# Patient Record
Sex: FEMALE
[De-identification: names, ages, dates, MRNs, and addresses within clinical notes are randomized per-mention and may not be internally consistent; named-entity substitution may affect disease eponyms.]

---

## 2017-10-19 PROBLEM — Z00.00 ENCOUNTER FOR PREVENTIVE HEALTH EXAMINATION: Status: ACTIVE | Noted: 2017-10-19

## 2017-11-09 ENCOUNTER — MEDICATION RENEWAL (OUTPATIENT)
Age: 82
End: 2017-11-09

## 2017-11-10 ENCOUNTER — APPOINTMENT (OUTPATIENT)
Dept: ENDOCRINOLOGY | Facility: CLINIC | Age: 82
End: 2017-11-10
Payer: MEDICARE

## 2017-11-10 VITALS
OXYGEN SATURATION: 98 % | WEIGHT: 177 LBS | HEART RATE: 84 BPM | SYSTOLIC BLOOD PRESSURE: 150 MMHG | HEIGHT: 60 IN | TEMPERATURE: 99.1 F | DIASTOLIC BLOOD PRESSURE: 82 MMHG | BODY MASS INDEX: 34.75 KG/M2

## 2017-11-10 DIAGNOSIS — M54.16 SPINAL STENOSIS, LUMBAR REGION WITHOUT NEUROGENIC CLAUDICATION: ICD-10-CM

## 2017-11-10 DIAGNOSIS — Z83.6 FAMILY HISTORY OF OTHER DISEASES OF THE RESPIRATORY SYSTEM: ICD-10-CM

## 2017-11-10 DIAGNOSIS — Z82.49 FAMILY HISTORY OF ISCHEMIC HEART DISEASE AND OTHER DISEASES OF THE CIRCULATORY SYSTEM: ICD-10-CM

## 2017-11-10 DIAGNOSIS — N13.5 CROSSING VESSEL AND STRICTURE OF URETER W/OUT HYDRONEPHROSIS: ICD-10-CM

## 2017-11-10 DIAGNOSIS — N32.89 OTHER SPECIFIED DISORDERS OF BLADDER: ICD-10-CM

## 2017-11-10 DIAGNOSIS — M48.061 SPINAL STENOSIS, LUMBAR REGION WITHOUT NEUROGENIC CLAUDICATION: ICD-10-CM

## 2017-11-10 DIAGNOSIS — N28.1 CYST OF KIDNEY, ACQUIRED: ICD-10-CM

## 2017-11-10 DIAGNOSIS — I38 ENDOCARDITIS, VALVE UNSPECIFIED: ICD-10-CM

## 2017-11-10 DIAGNOSIS — K21.9 GASTRO-ESOPHAGEAL REFLUX DISEASE W/OUT ESOPHAGITIS: ICD-10-CM

## 2017-11-10 DIAGNOSIS — M19.90 UNSPECIFIED OSTEOARTHRITIS, UNSPECIFIED SITE: ICD-10-CM

## 2017-11-10 DIAGNOSIS — Z82.3 FAMILY HISTORY OF STROKE: ICD-10-CM

## 2017-11-10 PROCEDURE — 99214 OFFICE O/P EST MOD 30 MIN: CPT

## 2017-11-11 PROBLEM — Z83.6 FAMILY HISTORY OF PULMONARY FIBROSIS: Status: ACTIVE | Noted: 2017-11-11

## 2017-11-11 PROBLEM — Z82.49 FAMILY HISTORY OF CORONARY ARTERY DISEASE: Status: ACTIVE | Noted: 2017-11-11

## 2017-11-11 PROBLEM — N32.89 IRRITABLE BLADDER: Status: ACTIVE | Noted: 2017-11-11

## 2017-11-11 PROBLEM — Z82.3 FAMILY HISTORY OF CEREBROVASCULAR ACCIDENT (CVA): Status: ACTIVE | Noted: 2017-11-11

## 2017-11-11 RX ORDER — TRAMADOL HYDROCHLORIDE 50 MG/1
50 TABLET, COATED ORAL
Refills: 0 | Status: ACTIVE | COMMUNITY
Start: 2017-11-11

## 2017-11-11 RX ORDER — GLUCOSAMINE/CHONDR SU A SOD 750-600 MG
600-200 TABLET ORAL DAILY
Refills: 0 | Status: ACTIVE | COMMUNITY
Start: 2017-11-11

## 2017-11-11 RX ORDER — ASPIRIN 81 MG/1
81 TABLET ORAL
Refills: 0 | Status: ACTIVE | COMMUNITY
Start: 2017-11-11

## 2017-11-11 RX ORDER — OMEPRAZOLE 20 MG/1
20 CAPSULE, DELAYED RELEASE ORAL DAILY
Refills: 0 | Status: ACTIVE | COMMUNITY
Start: 2017-11-11

## 2017-11-22 ENCOUNTER — CLINICAL ADVICE (OUTPATIENT)
Age: 82
End: 2017-11-22

## 2017-11-22 LAB
25(OH)D3 SERPL-MCNC: 57 NG/ML
ALBUMIN SERPL ELPH-MCNC: 4.3 G/DL
ALP BLD-CCNC: 62 U/L
ALT SERPL-CCNC: 19 U/L
ANION GAP SERPL CALC-SCNC: 16 MMOL/L
AST SERPL-CCNC: 36 U/L
BILIRUB SERPL-MCNC: 0.4 MG/DL
BUN SERPL-MCNC: 16 MG/DL
CALCIUM SERPL-MCNC: 9.9 MG/DL
CHLORIDE SERPL-SCNC: 102 MMOL/L
CHOLEST SERPL-MCNC: 121 MG/DL
CHOLEST/HDLC SERPL: 3.3 RATIO
CO2 SERPL-SCNC: 26 MMOL/L
CREAT SERPL-MCNC: 1.02 MG/DL
CREAT SPEC-SCNC: 127 MG/DL
GLUCOSE SERPL-MCNC: 105 MG/DL
HBA1C MFR BLD HPLC: 6.4 %
HDLC SERPL-MCNC: 37 MG/DL
LDLC SERPL CALC-MCNC: 69 MG/DL
MICROALBUMIN 24H UR DL<=1MG/L-MCNC: 3.7 MG/DL
MICROALBUMIN/CREAT 24H UR-RTO: 29 MG/G
POTASSIUM SERPL-SCNC: 4.5 MMOL/L
PROT SERPL-MCNC: 7.6 G/DL
SODIUM SERPL-SCNC: 144 MMOL/L
T4 FREE SERPL-MCNC: 1.4 NG/DL
TRIGL SERPL-MCNC: 75 MG/DL
TSH SERPL-ACNC: 0.88 UIU/ML

## 2018-03-09 ENCOUNTER — APPOINTMENT (OUTPATIENT)
Dept: ENDOCRINOLOGY | Facility: CLINIC | Age: 83
End: 2018-03-09
Payer: MEDICARE

## 2018-03-09 VITALS
TEMPERATURE: 98 F | BODY MASS INDEX: 33.36 KG/M2 | DIASTOLIC BLOOD PRESSURE: 79 MMHG | SYSTOLIC BLOOD PRESSURE: 144 MMHG | HEIGHT: 61.5 IN | WEIGHT: 179 LBS | OXYGEN SATURATION: 97 % | HEART RATE: 73 BPM

## 2018-03-09 DIAGNOSIS — N39.0 URINARY TRACT INFECTION, SITE NOT SPECIFIED: ICD-10-CM

## 2018-03-09 PROCEDURE — 99214 OFFICE O/P EST MOD 30 MIN: CPT

## 2018-07-02 ENCOUNTER — APPOINTMENT (OUTPATIENT)
Dept: ENDOCRINOLOGY | Facility: CLINIC | Age: 83
End: 2018-07-02
Payer: MEDICARE

## 2018-07-02 VITALS
DIASTOLIC BLOOD PRESSURE: 76 MMHG | BODY MASS INDEX: 33.18 KG/M2 | WEIGHT: 178 LBS | OXYGEN SATURATION: 95 % | HEIGHT: 61.5 IN | TEMPERATURE: 98.4 F | SYSTOLIC BLOOD PRESSURE: 133 MMHG | HEART RATE: 79 BPM

## 2018-07-02 PROCEDURE — 99214 OFFICE O/P EST MOD 30 MIN: CPT

## 2018-07-26 ENCOUNTER — CLINICAL ADVICE (OUTPATIENT)
Age: 83
End: 2018-07-26

## 2018-07-31 ENCOUNTER — MEDICATION RENEWAL (OUTPATIENT)
Age: 83
End: 2018-07-31

## 2018-08-29 ENCOUNTER — CLINICAL ADVICE (OUTPATIENT)
Age: 83
End: 2018-08-29

## 2018-08-29 ENCOUNTER — MEDICATION RENEWAL (OUTPATIENT)
Age: 83
End: 2018-08-29

## 2018-10-12 ENCOUNTER — MEDICATION RENEWAL (OUTPATIENT)
Age: 83
End: 2018-10-12

## 2018-10-19 ENCOUNTER — CLINICAL ADVICE (OUTPATIENT)
Age: 83
End: 2018-10-19

## 2018-11-08 ENCOUNTER — APPOINTMENT (OUTPATIENT)
Dept: ENDOCRINOLOGY | Facility: CLINIC | Age: 83
End: 2018-11-08
Payer: MEDICARE

## 2018-11-08 VITALS
TEMPERATURE: 98.3 F | OXYGEN SATURATION: 95 % | HEART RATE: 68 BPM | BODY MASS INDEX: 32.48 KG/M2 | WEIGHT: 174.25 LBS | SYSTOLIC BLOOD PRESSURE: 114 MMHG | DIASTOLIC BLOOD PRESSURE: 74 MMHG | HEIGHT: 61.5 IN

## 2018-11-08 DIAGNOSIS — N20.0 CALCULUS OF KIDNEY: ICD-10-CM

## 2018-11-08 PROCEDURE — 99214 OFFICE O/P EST MOD 30 MIN: CPT

## 2018-11-08 RX ORDER — OXYBUTYNIN CHLORIDE 10 MG/1
10 TABLET, EXTENDED RELEASE ORAL
Refills: 0 | Status: DISCONTINUED | COMMUNITY
Start: 2017-11-11 | End: 2018-11-08

## 2018-11-10 PROBLEM — N20.0 NEPHROLITHIASIS: Status: ACTIVE | Noted: 2018-11-10

## 2019-03-11 ENCOUNTER — APPOINTMENT (OUTPATIENT)
Dept: ENDOCRINOLOGY | Facility: CLINIC | Age: 84
End: 2019-03-11
Payer: MEDICARE

## 2019-03-11 VITALS
DIASTOLIC BLOOD PRESSURE: 74 MMHG | WEIGHT: 170 LBS | HEART RATE: 72 BPM | SYSTOLIC BLOOD PRESSURE: 130 MMHG | HEIGHT: 61 IN | BODY MASS INDEX: 32.1 KG/M2 | OXYGEN SATURATION: 99 %

## 2019-03-11 PROCEDURE — 99214 OFFICE O/P EST MOD 30 MIN: CPT

## 2019-03-16 RX ORDER — UBIDECARENONE/VIT E ACET 100MG-5
50 MCG CAPSULE ORAL
Refills: 0 | Status: DISCONTINUED | COMMUNITY
Start: 2017-11-11 | End: 2019-03-16

## 2019-03-16 NOTE — HISTORY OF PRESENT ILLNESS
[FreeTextEntry1] : 83-year-old woman who is followed for type 2 DM, hypothyroidism, hyperlipidemia and hypertension.  She has multiple other medical problems which include lumbar spinal stenosis, osteoarthritis of her hands and knees, valvular heart disease (aortic sclerosis with mild AS, also mild MR), irritable bladder and bilateral rotator cuff tears (with previous arthroscopic surgery on her left shoulder in 2017).  She also has a history of nephrolithiasis\par \anne-marie Had a prolonged respiratory tract infection in January, possibly true influenza.  Has now recovered fully, but was profoundly fatigued during the interval when she as ill.  Had fever to approximately 37.5 degrees Centigrade.\par Testing fingersticks twice a day.  Recent pre-breakfast values have been in the 110-140 range, though she was running 140-160 during her illness.  Approximately half of her bedtime fingersticks are over 150, usually because of excessive fruit (or juice) after supper.  The few readings which she has taken after breakfast or lunch have been < 150.\anne-marie Has lost 4 lb in weight since her last visit, but is not sure what to attribute this to--simply says "I don't eat as much."\par --Breakfast is oatmeal (?? 1 cup) plus a slice of bread with either cheese or ham.\par --Lunch is usually fish.  Starch is buckwheat\par --Supper is soup--she will usually put some type of starch in the soup\par Exercise is limited to walking, mostly on errands.\par Ophth exam 2 weeks ago was apparently negative for retinopathy.\par Denies any numbness or paresthesias in her feet.

## 2019-03-16 NOTE — PHYSICAL EXAM
[Alert] : alert [No Acute Distress] : no acute distress [Normal Sclera/Conjunctiva] : normal sclera/conjunctiva [PERRL] : pupils equal, round and reactive to light [EOMI] : extra ocular movement intact [No Proptosis] : no proptosis [No Lid Lag] : no lid lag [Normal Outer Ear/Nose] : the ears and nose were normal in appearance [Normal Hearing] : hearing was normal [No Neck Mass] : no neck mass was observed [No LAD] : no lymphadenopathy [Clear to Auscultation] : lungs were clear to auscultation bilaterally [Normal Rate] : heart rate was normal  [Regular Rhythm] : with a regular rhythm [Carotids Normal] : carotid pulses were normal with no bruits [No Edema] : there was no peripheral edema [Not Tender] : non-tender [Soft] : abdomen soft [No HSM] : no hepato-splenomegaly [No CVA Tenderness] : no ~M costovertebral angle tenderness [Normal Strength/Tone] : muscle strength and tone were normal [No Rash] : no rash [Normal Sensation on Monofilament Testing] : normal sensation on monofilament testing of lower extremities [Normal Affect] : the affect was normal [Normal Mood] : the mood was normal [Foot Ulcers] : no foot ulcers [Hirsutism] : no hirsutism [Acanthosis Nigricans] : no acanthosis nigricans [de-identified] : Moderately obese [de-identified] : No corneal arcus or xanthelasma [de-identified] : Thyroid mildly enlarged and somewhat firm, but without any discrete nodularity [de-identified] : Grade 2/6 BRIGIDO loudest over the aortic area, with a somewhat softer murmur over the apex [de-identified] : DP pulses 3+ bilaterally [de-identified] : Obese [de-identified] : No cervical or supraclavicular adenopathy [de-identified] : Mild dorsal kyphosis [de-identified] : Mild swelling of both knees.  Osteoarthritic deformities of both hands [de-identified] : Slight resting tremor of both hands.  Vibratory sensation moderately decreased over the toes

## 2019-03-16 NOTE — REVIEW OF SYSTEMS
[Fatigue] : fatigue [Dry Skin] : dry skin [Difficulty Walking] : difficulty walking [Decreased Appetite] : appetite not decreased [Fever] : no fever [Chills] : no chills [Blurry Vision] : no blurred vision [Dry Eyes] : no dryness of the eyes [Eyes Itch] : no itching of the eyes [Dysphagia] : no dysphagia [Hearing Loss] : no hearing loss  [Chest Pain] : no chest pain [Palpitations] : no palpitations [Leg Claudication] : no intermittent leg claudication [Lower Ext Edema] : no lower extremity edema [Shortness Of Breath] : no shortness of breath [Wheezing] : no wheezing was heard [Cough] : no cough [SOB on Exertion] : no shortness of breath during exertion [Nausea] : no nausea [Constipation] : no constipation [Diarrhea] : no diarrhea [Heartburn] : no heartburn [Polyuria] : no polyuria [Dysuria] : no dysuria [Nocturia] : no nocturia [Muscle Cramps] : no muscle cramps [Myalgia] : no myalgia  [Hirsutism] : no hirsutism [Hair Loss] : no hair loss [Headache] : no headaches [Tremors] : no tremors [Pain/Numbness of Digits] : no pain/numbness of digits [Depression] : no depression [Anxiety] : no anxiety [Insomnia] : no insomnia [Stress] : no stress [Polydipsia] : no polydipsia [Cold Intolerance] : cold tolerant [Heat Intolerance] : heat tolerant [Easy Bleeding] : no ~M tendency for easy bleeding [Easy Bruising] : no tendency for easy bruising [FreeTextEntry2] : Has lost 7 lb since her last visit. [FreeTextEntry9] : Has been receiving Synvisc injections in both knees.  Back pain is now "6/10"--is not associated with a radicular component [de-identified] : Ambulation is impaired by her back pain

## 2019-03-16 NOTE — ASSESSMENT
[FreeTextEntry1] : 1) Type 2 DM:  Glycemic control is excellent by A1c level.  She is still intermittently hyperglycemic after supper, but these spikes are almost certainly diet-related.\par --Continue metformin\par --Continue fingerstick monitoring twice a day, but focus on obtaining readings 2 hours after meals, rather than the morning and bedtime.  To log her food intake for those nights when her post-supper fingerstick is above 150.\par --Diet discussed--Advised to avoid juice, and limit her fruit intake after supper to one serving\par \par 2) Hypercholesterolemia:  LDL-cholesterol is below her target of 70 mg%.\par --Continue rosuvastatin 20 mg/day\par \par 3) Hypothyroidism:  TSH level is in the optimal range of 0.4-2.5 uU/ml  \par --Continue levothyroxine 100 mcg 6 days/week\par \par 4) Hypertension:  BP is excellent on today's exam, but she needs to increase her monitoring at home.\par --Continue losartan\par \par 5) Vitamin D deficiency:  25-D level is within the laboratory normal range but somewhat above the desired range.  She did not decrease her dose as instructed at the last visit, and is still taking 4000 units 7 days/week.\par --To decrease to 3000 units/day\par \par See for follow-up in 4 months.  CMP, lipids, A1c, TFTs, microalb, 25-D before the visit. [FreeTextEntry2] : Diet (fruit/juice), post-prandial FS monitoring

## 2019-03-16 NOTE — DATA REVIEWED
[FreeTextEntry1] : [facility]  (3/9/19)\par \par FBS 99,  A1c 6.3%\par CMP WNL\par LDL 61, HDL 39, \par TSH 1.47 uU/ml  (0.4-4.5)\par 25-D level 65 ng/ml

## 2019-07-10 ENCOUNTER — RX RENEWAL (OUTPATIENT)
Age: 84
End: 2019-07-10

## 2019-08-21 ENCOUNTER — APPOINTMENT (OUTPATIENT)
Dept: ENDOCRINOLOGY | Facility: CLINIC | Age: 84
End: 2019-08-21
Payer: MEDICARE

## 2019-08-21 VITALS
WEIGHT: 169 LBS | SYSTOLIC BLOOD PRESSURE: 125 MMHG | OXYGEN SATURATION: 95 % | HEIGHT: 61 IN | DIASTOLIC BLOOD PRESSURE: 75 MMHG | TEMPERATURE: 98.5 F | BODY MASS INDEX: 31.91 KG/M2 | HEART RATE: 74 BPM

## 2019-08-21 PROCEDURE — 99214 OFFICE O/P EST MOD 30 MIN: CPT

## 2019-08-26 NOTE — REVIEW OF SYSTEMS
[Fatigue] : fatigue [Decreased Appetite] : appetite not decreased [Recent Weight Gain (___ Lbs)] : no recent weight gain [Recent Weight Loss (___ Lbs)] : no recent weight loss [Chills] : no chills [Blurry Vision] : no blurred vision [Dry Eyes] : no dryness of the eyes [Eyes Itch] : no itching of the eyes [Dysphagia] : no dysphagia [Hearing Loss] : no hearing loss  [Chest Pain] : no chest pain [Palpitations] : no palpitations [Lower Ext Edema] : no lower extremity edema [Shortness Of Breath] : no shortness of breath [Wheezing] : no wheezing was heard [SOB on Exertion] : no shortness of breath during exertion [Nausea] : no nausea [Constipation] : no constipation [Diarrhea] : no diarrhea [Heartburn] : no heartburn [Polyuria] : no polyuria [Dysuria] : no dysuria [Nocturia] : no nocturia [Muscle Cramps] : no muscle cramps [Myalgia] : no myalgia  [Hirsutism] : no hirsutism [Hair Loss] : no hair loss [Dry Skin] : no dry skin [Headache] : no headaches [Tremors] : no tremors [Pain/Numbness of Digits] : no pain/numbness of digits [Depression] : no depression [Anxiety] : no anxiety [Insomnia] : no insomnia [Stress] : no stress [Polydipsia] : no polydipsia [Cold Intolerance] : cold tolerant [Heat Intolerance] : heat tolerant [Easy Bleeding] : no ~M tendency for easy bleeding [Easy Bruising] : no tendency for easy bruising [FreeTextEntry2] : See HPI re: recent fevers [FreeTextEntry3] : Vision is excellent since the cataract extractions [FreeTextEntry6] : Recent cough has largely resolved [FreeTextEntry9] : Pain in both knees and her neck (C-spine) [de-identified] : Ambulation impaired by her knee pain

## 2019-08-26 NOTE — ASSESSMENT
[FreeTextEntry1] : See for follow-up in 4 months.  CMP, lipids, A1c, 25-D, TFTs and microalb before the visit

## 2019-08-26 NOTE — REASON FOR VISIT
[Follow-Up: _____] : a [unfilled] follow-up visit [FreeTextEntry1] : type 2 DM, hypothyroidism and hyperlipidemia

## 2019-08-26 NOTE — HISTORY OF PRESENT ILLNESS
[FreeTextEntry1] : 83-year-old woman who is followed for type 2 DM, hypothyroidism, hyperlipidemia and hypertension.  She has multiple other medical problems which include lumbar spinal stenosis, osteoarthritis of her hands and knees, valvular heart disease (aortic sclerosis with mild AS, also mild MR), irritable bladder and bilateral rotator cuff tears (with previous arthroscopic surgery on her left shoulder in 2017).  She also has a history of nephrolithiasis\par \par Interim history since her last visit:\par --Has had a mild febrile illness during the past two weeks with sweats, fevers to as high as 37.5 Centigrade, mild cough and ?? nasal congestion.  Saw her PCP, had a chest X-ray a few days ago, but did not yet get the results.\par --An updated echocardiogram showed her AS to be stable, though the valve area is down to 0.98 cm2.  No intervention was suggested.  She denies any BAIN, chest pain or orthopnea.\par --Seems to have had twa RF ablation procedures on her lower back done by Dr. Coles last month (presumably for facet arthropathy).  Has had partial relief of her back pain.  \par Pre-breakfast fingersticks have been 110-120.  She has taken occasional readings 2 hours after supper, and these are < 140.\par Current diet:\par --Having oatmeal for breakfast, often with blueberries\par --Lunch is half a sandwich\par --Supper is fish or chicken, vegetables and buckwheat noodles, but sometimes just vegetable soup with chunks of meat\par Denies any numbness or paresthesias in her feet.\par Sees her ophthalmologist every 4 months, but has no retinopathy and no changes in vision

## 2019-08-26 NOTE — PHYSICAL EXAM
[Alert] : alert [No Acute Distress] : no acute distress [Normal Sclera/Conjunctiva] : normal sclera/conjunctiva [PERRL] : pupils equal, round and reactive to light [No Proptosis] : no proptosis [EOMI] : extra ocular movement intact [No Lid Lag] : no lid lag [Normal Outer Ear/Nose] : the ears and nose were normal in appearance [Normal Hearing] : hearing was normal [No Neck Mass] : no neck mass was observed [No LAD] : no lymphadenopathy [Clear to Auscultation] : lungs were clear to auscultation bilaterally [Normal Rate] : heart rate was normal  [Regular Rhythm] : with a regular rhythm [Carotids Normal] : carotid pulses were normal with no bruits [No Edema] : there was no peripheral edema [Not Tender] : non-tender [No HSM] : no hepato-splenomegaly [Soft] : abdomen soft [Normal] : normal and non tender [No CVA Tenderness] : no ~M costovertebral angle tenderness [No Joint Swelling] : no joint swelling seen [Normal Gait] : normal gait [Normal Strength/Tone] : muscle strength and tone were normal [No Involuntary Movements] : no involuntary movements were seen [No Rash] : no rash [Normal Sensation on Monofilament Testing] : normal sensation on monofilament testing of lower extremities [Normal Affect] : the affect was normal [No Tremors] : no tremors [Normal Mood] : the mood was normal [Foot Ulcers] : no foot ulcers [Hirsutism] : no hirsutism [Acanthosis Nigricans] : no acanthosis nigricans [de-identified] : Mildly obese [de-identified] : No corneal arcus or xanthelasma [de-identified] : Thyroid upper limits of normal in size [de-identified] : Grade 2/6 BRIGIDO at the LSB and aortic area, somewhat high-pitched [de-identified] : DP pulses 3+ bilaterally [de-identified] : No cervical or supraclavicular adenopathy [de-identified] : Mild dorsal kyphosis [de-identified] : Mild crepitus on flexion/extension of both knees [de-identified] : Vibratory sensation moderately decrased over the toes

## 2019-09-16 ENCOUNTER — RX RENEWAL (OUTPATIENT)
Age: 84
End: 2019-09-16

## 2019-10-01 ENCOUNTER — RX RENEWAL (OUTPATIENT)
Age: 84
End: 2019-10-01

## 2020-01-21 ENCOUNTER — APPOINTMENT (OUTPATIENT)
Dept: ENDOCRINOLOGY | Facility: CLINIC | Age: 85
End: 2020-01-21
Payer: MEDICARE

## 2020-01-21 VITALS
HEART RATE: 82 BPM | HEIGHT: 61 IN | OXYGEN SATURATION: 97 % | BODY MASS INDEX: 32.47 KG/M2 | DIASTOLIC BLOOD PRESSURE: 77 MMHG | WEIGHT: 172 LBS | SYSTOLIC BLOOD PRESSURE: 154 MMHG

## 2020-01-21 PROCEDURE — 99214 OFFICE O/P EST MOD 30 MIN: CPT

## 2020-01-21 NOTE — ASSESSMENT
[FreeTextEntry1] : 1) Type 2 DM:  Glycemic control is excellent by A1c level and fingerstick monitoring despite the two-month hiatus when she was off metformin.\par --To continue metformin- mg BID.  (I explained that her symptom of apparent unilateral sweating is unusual, but likely neurological, and suggested she see her neurologist about this)\par --Continue testing fingersticks twice a day, and try to test her post-prandials at the 2-hour time point.\par --Diet discussed--no obvious modifications are necessary, but she needs to avoid the Craisins at breakfast given the added sugar\par \par 2) Hypercholesterolemia:  LDL-cholesterol is well below her target of 70 mg%.\par --Continue rosuvastatin 20 mg/day\par \par 3) Hypothyroidism:  TSH level is only a trace above the optimal range of 0.4-2.5 uU/ml.\par --Continue levothyroxine 100 mcg 6 days/week\par \par 4) Hypertension:  BP is moderately elevated on today's exam.  Though this is not necessarily a representative value, she needs to restart losartan for diabetic nephro-protection even if she does not require it for treatment of hypertension.  (It also does not seem that her BP was frankly low at her Cardiology visit).\par --Restart losartan at 25 mg/day\par --Resume BP monitoring at home.  (Target of < 130-140/80-85 was discussed)\par \par 5) Vitamin D deficiency:  25-D level is within normal limits but somewhat above the target range of 30-50 ng/ml.  She misunderstood the last instructions, however, and has been taking 4000 units/day.  To decrease back to 3000 units/day.\par \par See for follow-up in 4 months.  CMP, lipids, A1c, TFTs, microalb, 25-D before the visit [FreeTextEntry2] : Diet, timing of fingersticks, resumption of home BP monitoring

## 2020-01-21 NOTE — PHYSICAL EXAM
[Alert] : alert [No Acute Distress] : no acute distress [Normal Sclera/Conjunctiva] : normal sclera/conjunctiva [PERRL] : pupils equal, round and reactive to light [EOMI] : extra ocular movement intact [No Proptosis] : no proptosis [No Lid Lag] : no lid lag [Normal Outer Ear/Nose] : the ears and nose were normal in appearance [Normal Hearing] : hearing was normal [No Neck Mass] : no neck mass was observed [No LAD] : no lymphadenopathy [Thyroid Not Enlarged] : the thyroid was not enlarged [Normal Rate and Effort] : normal respiratory rhythm and effort [Clear to Auscultation] : lungs were clear to auscultation bilaterally [Normal Rate] : heart rate was normal  [Regular Rhythm] : with a regular rhythm [Carotids Normal] : carotid pulses were normal with no bruits [No Edema] : there was no peripheral edema [Not Tender] : non-tender [Soft] : abdomen soft [Normal] : normal and non tender [No CVA Tenderness] : no ~M costovertebral angle tenderness [Normal Gait] : normal gait [No Joint Swelling] : no joint swelling seen [Normal Strength/Tone] : muscle strength and tone were normal [No Involuntary Movements] : no involuntary movements were seen [No Rash] : no rash [No Tremors] : no tremors [Normal Sensation on Monofilament Testing] : normal sensation on monofilament testing of lower extremities [Normal Affect] : the affect was normal [Normal Mood] : the mood was normal [Foot Ulcers] : no foot ulcers [de-identified] : Moderately obese [Acanthosis Nigricans] : no acanthosis nigricans [de-identified] : No corneal arcus or xanthelasma [de-identified] : Grade 2-3/6 BRIGIDO at the LSB and aortic area [de-identified] : DP pulses 2+ on the right, 3+ on the left [de-identified] : No cervical or supraclavicular adenopathy [de-identified] : Liver edge 2-3 FB below the RCM [de-identified] : No scalp hair thinning [de-identified] : Mild dorsal kyphosis [de-identified] : Vibratory sensation moderately decreased over the toes

## 2020-01-21 NOTE — DATA REVIEWED
[FreeTextEntry1] : [facility]  (1/16/2020)\par \par FBS 98,  A1c 6.3%\par CMP WNL  (K 3.9)\par TSH 2.56 uU/ml  (0.4-4.5)\par LDL 59, HDL 43, TG 75\par Urine microalb ratio negative at 23 (normal < 30)\par 25-D level 63 ng/ml

## 2020-01-21 NOTE — HISTORY OF PRESENT ILLNESS
[FreeTextEntry1] : 84-year-old woman who is followed for type 2 DM, hypothyroidism, hyperlipidemia and hypertension.  She has multiple other medical problems which include lumbar spinal stenosis, osteoarthritis of her hands and knees, valvular heart disease (aortic sclerosis with mild AS, also mild MR), irritable bladder and bilateral rotator cuff tears (with previous arthroscopic surgery on her left shoulder in 2017).  She also has a history of nephrolithiasis\par \anne-marie Has not had any significant medical issues since her last visit, though was told by her cardiologist to stop the losartan because of a borderline low BP at her office visit in October.  (She has not been monitoring BPs at home).\anne-marie Has been testing fingersticks twice a day and brought a log of her readings.  Her pre-breakfast readings are stable in the 120-135 range, as are the pre-supper values.  Her post-prandial fingersticks are occasionally elevated to 150-160 mg%, but most are in target range.  \par She stopped the metformin for two months because she thought that it might be responsible for unusual sweating on the right side of her upper body  (back and R arm).  She then restarted it in December after her symptoms failed to resolve and her blood sugars forest slightly while she was off the medication..\par Current diet: Tends to eat only two meals a day\par --Brunch is oatmeal with blueberries (sometimes craisins, which are dried cranberries with added sugar)\par --Main meal is in the late afternoon--protein is usually chicken or fish, occasionally ground beef.  Starch is buckwheat or potato.  \par --Will sometimes snack on an apple and cheese at night.\par Ophth exam last month was negative for retinopathy.  (Apparently had a fluorescein angio at that visit)

## 2020-01-21 NOTE — REVIEW OF SYSTEMS
[Easy Bruising] : a tendency for easy bruising [Fatigue] : no fatigue [Decreased Appetite] : appetite not decreased [Fever] : no fever [Chills] : no chills [Blurry Vision] : no blurred vision [Dry Eyes] : no dryness of the eyes [Eyes Itch] : no itching of the eyes [Dysphagia] : no dysphagia [Hearing Loss] : no hearing loss  [Chest Pain] : no chest pain [Palpitations] : no palpitations [Leg Claudication] : no intermittent leg claudication [Shortness Of Breath] : no shortness of breath [Wheezing] : no wheezing was heard [Cough] : no cough [SOB on Exertion] : no shortness of breath during exertion [Nausea] : no nausea [Constipation] : no constipation [Diarrhea] : no diarrhea [Heartburn] : no heartburn [Polyuria] : no polyuria [Dysuria] : no dysuria [Nocturia] : no nocturia [Hair Loss] : no hair loss [Dry Skin] : no dry skin [Tremors] : no tremors [Headache] : no headaches [Pain/Numbness of Digits] : no pain/numbness of digits [Depression] : no depression [Anxiety] : no anxiety [Insomnia] : no insomnia [Stress] : no stress [Polydipsia] : no polydipsia [Cold Intolerance] : cold tolerant [Heat Intolerance] : heat tolerant [Easy Bleeding] : no ~M tendency for easy bleeding [FreeTextEntry9] : Usual arthralgias in her knees and hands [FreeTextEntry5] : Had moderate LE edema for a few days last week, now resolved--was likely due to high salt intake on Russian New Years [FreeTextEntry2] : Gained 3 lb since her last visit [de-identified] : Ambulation is moderately impaired by her knee pain

## 2020-02-07 RX ORDER — LOSARTAN POTASSIUM 50 MG/1
50 TABLET, FILM COATED ORAL
Qty: 90 | Refills: 3 | Status: DISCONTINUED | COMMUNITY
Start: 2017-11-11 | End: 2020-02-07

## 2020-05-18 ENCOUNTER — APPOINTMENT (OUTPATIENT)
Dept: ENDOCRINOLOGY | Facility: CLINIC | Age: 85
End: 2020-05-18

## 2020-05-28 ENCOUNTER — APPOINTMENT (OUTPATIENT)
Dept: ENDOCRINOLOGY | Facility: CLINIC | Age: 85
End: 2020-05-28
Payer: MEDICARE

## 2020-05-28 DIAGNOSIS — E55.9 VITAMIN D DEFICIENCY, UNSPECIFIED: ICD-10-CM

## 2020-05-28 PROCEDURE — 99443: CPT | Mod: 95

## 2020-05-28 NOTE — REVIEW OF SYSTEMS
[Fatigue] : fatigue [Back Pain] : back pain [Insomnia] : insomnia [Decreased Appetite] : appetite not decreased [Fever] : no fever [Blurred Vision] : no blurred vision [Dry Eyes] : no dryness [Chills] : no chills [Eyes Itch] : no itch [Hearing Loss] : no hearing loss  [Dysphagia] : no dysphagia [Chest Pain] : no chest pain [Shortness Of Breath] : no shortness of breath [Palpitations] : no palpitations [Lower Ext Edema] : no lower extremity edema [Wheezing] : no wheezing [SOB on Exertion] : no shortness of breath on exertion [Orthopnea] : no orthopnea [Constipation] : no constipation [Diarrhea] : no diarrhea [Heartburn] : no heartburn [Dysuria] : no dysuria [Muscle Weakness] : no muscle weakness [Myalgia] : no myalgia  [Dry Skin] : no dry skin [Ulcer] : no ulcer [Hair Loss] : no hair loss [Headaches] : no headaches [Depression] : no depression [Tremors] : no tremors [Polydipsia] : no polydipsia [Heat Intolerance] : no heat intolerance [Cold Intolerance] : no cold intolerance [FreeTextEntry2] : Thinks that she has lost a few lb in weight [Easy Bleeding] : no ~M tendency for easy bleeding [Easy Bruising] : no tendency for easy bruising [FreeTextEntry7] : Morning nausea as noted in the HPI. [FreeTextEntry8] : Increased urinary frequency during the day plus nocturia 2-3X/night [FreeTextEntry6] : Has a chronic cough associated with the increased nasal congestion noted above.  [FreeTextEntry4] : Nasal congestion for the past several weeks--has not previously had any spring pollen allergies [de-identified] : Emotional stress related to COVID [de-identified] : See comments in the HPI re: sensory symptoms in her left foot [FreeTextEntry9] : Usual pain and mild swelling in both knees.  Muscle cramps in her left lower leg

## 2020-05-28 NOTE — HISTORY OF PRESENT ILLNESS
[FreeTextEntry1] : 84-year-old woman who is followed for type 2 DM, hypothyroidism, hyperlipidemia and hypertension.  She has multiple other medical problems which include lumbar spinal stenosis, osteoarthritis of her hands and knees, valvular heart disease (aortic sclerosis with mild AS, also mild MR), irritable bladder and bilateral rotator cuff tears (with previous arthroscopic surgery on her left shoulder in 2017).  She also has a history of nephrolithiasis\par \par Due to the current COVID restrictions and the pt's high-risk status, today's visit was carried out by phone.  Pt understands that this is still an official office visit for which a bill will be submitted, and gave her verbal consent.\par \anne-marie Has not had any obvious acute illnesses since her last visit, but says that since the onset of the COVID pandemic and her sheltering-at home that she feels subjectively "ill" after she returns home from going out to shop, etc,  She seems to have subjective fever (though no actual fever) but no respiratory symptoms. She usually feels better in approximately 2 days.\par Other complaints:\par --Morning nausea which she blames on her levothyroxine and omeprazole, both taken before breakfast\par --Cramping in her left lower leg, as well as paresthesias in her first toe.  Denies any associated weakness.  Also denies any typical sciatic-type symptoms in her upper leg.\anne-marie Has been taking metformin 500 mg BID consistently.  \par Pre-breakfast fingersticks have been 120-130, and post-prandials below 140-150.  (Had one FS over 200 mg% after eating a piece of pie with supper).  \anne-marie Has been eating two meals a day.  The first meal is either oatmeal (with blueberries) or half a sandwich.  Her main meal is in the late afternoon, and the starch is usually buckwheat.\par Thinks that she has lost a few lb in weight.\par Ophth exam last week was apparently negative.\par Neuropathic symptoms in her left foot are as described above.  Has no symptoms in her R foot.\anne-marie Admits to missing doses of rosuvastatin--says that her prescription  and she has been unable to reach her PCP.

## 2020-05-28 NOTE — ASSESSMENT
[FreeTextEntry1] : 1) Type 2 DM:  Glycemic control is excellent as assessed by A1c level and pt's reported fingersticks.  The fingerstick values after supper are in target range, but she sometimes has two pieces of fruit (two clementines) later at night and has not tested after these--suggested that she do so.\par --Continue monotherapy with metformin\par --Continue FS testing once a day, focus on post-prandial monitoring\par \par 2) Hypothyroidism:  TSH level is slightly above the optimal range of 0.4-2.5 uU/ml but still well within normal limits,.\par --To continue levothyroxine 100 mcg 6 days/week.\par --The cause for her morning nausea is unclear, but doubt that it is due to the levothyroxine.  Nonetheless, suggested that she can take the pills in the middle of the night when she gets up to urinate\par \par 3) Hypercholesterolemia:  LDL-cholesterol is somewhat above her target of 70 mg%, and higher than most of her previous values on the same medication regimen--almost certainly reflects the missed doses of Crestor.\par --To continue rosuvastatin 20 mg/day\par --She will be changing her pharmacy (because of her concern that the [employer] store in which her pharmacy is located is too crowded).  She is to let me know the phone number for the new pharmacy and I will send a refill for the rosuvastatin.\par \par 4) Vitamin D deficiency:  Her 25-D level is WNL but is again somewhat above the target range despite the decrease in supplementation to 3000 units/day.  \par --To decrease the dose further to 2000 units/day\par \par See for follow-up in 3-4 months.  CMP, lipids, A1c, TFTs, 25-D before the visit. [FreeTextEntry2] : Diet, medication compliance (rosuvastatin), timing of levothyroxine

## 2020-05-28 NOTE — DATA REVIEWED
[FreeTextEntry1] : [facility]  (5/14/20)\par \par ,  A1c 6.0%\par CMP WNL\par Urine microalb ratio negative at 7.0  (normal < 30)\par LDL 94, HDL 36, TG 74\par TSH 2.95 uU/ml  (0.4-4.5)\par 25-D level 66 ng/ml

## 2020-09-11 ENCOUNTER — APPOINTMENT (OUTPATIENT)
Dept: ENDOCRINOLOGY | Facility: CLINIC | Age: 85
End: 2020-09-11

## 2020-10-22 ENCOUNTER — RX RENEWAL (OUTPATIENT)
Age: 85
End: 2020-10-22

## 2020-10-28 ENCOUNTER — APPOINTMENT (OUTPATIENT)
Dept: ENDOCRINOLOGY | Facility: CLINIC | Age: 85
End: 2020-10-28
Payer: MEDICARE

## 2020-10-28 VITALS
WEIGHT: 169 LBS | SYSTOLIC BLOOD PRESSURE: 131 MMHG | OXYGEN SATURATION: 97 % | DIASTOLIC BLOOD PRESSURE: 69 MMHG | HEART RATE: 66 BPM | TEMPERATURE: 97.9 F | BODY MASS INDEX: 31.91 KG/M2 | HEIGHT: 61 IN

## 2020-10-28 PROCEDURE — 99214 OFFICE O/P EST MOD 30 MIN: CPT

## 2020-11-01 NOTE — DATA REVIEWED
[FreeTextEntry1] : [facility]  (10/24/20)\par \par , A1c 5.8%\par CMP WNL\par LDL 76, HDL 41, \par TSH 0.58 uU/ml  (0.4-4.5)\par 25-D level excellent at 52 ng/ml

## 2020-11-01 NOTE — HISTORY OF PRESENT ILLNESS
[FreeTextEntry1] : 85-year-old woman who is followed for type 2 DM, hypothyroidism, hyperlipidemia and hypertension.  She has multiple other medical problems which include lumbar spinal stenosis, osteoarthritis of her hands and knees, valvular heart disease (aortic sclerosis with mild AS, also mild MR), irritable bladder and bilateral rotator cuff tears (with previous arthroscopic surgery on her left shoulder in 2017).  She also has a history of nephrolithiasis\par \anne-marie Has not had any acute illnesses since her last visit, but her arthritic problems persist.  Had Synvisc injections in her R knee last month, and has now had two injections so far in the left.  She has also had pain in her R wrist (probably a tendinitis) but she has not seen her rheumatologist about this.  She has been using Voltaren cream, but is not sure whether it has helped\par Pre-breakfast fingersticks have been 110-125, pre-supper somewhat higher at "140."\anne-marie --Breakfast is oatmeal with dried cranberries or blueberries. Will also sometimes have half a sandwich.  Has not tested any fingersticks after this meal. \anne-marie --Has her main meal in the mid-to-late afternoon--usually a large bowl of soup, which often contains meat, sometimes brown rice\anne-marie --Has only an apple, sometimes with a slice of cheese in the evening\anne-marie Exercises on a stationary bike for 10 min/day.  Does not have any chest tightness or dyspnea while on the bike, and says that she intends to increase the length of the workout..\anne-marie Has not been checking BPs at home.\anne-marie Continues to have daytime drowsiness, but has still not had a sleep study.  (Was going to do this at [facility], but would have had to pay $1200).

## 2020-11-01 NOTE — REVIEW OF SYSTEMS
[Fatigue] : fatigue [Dry Skin] : dry skin [Difficulty Walking] : difficulty walking [Decreased Appetite] : appetite not decreased [Fever] : no fever [Chills] : no chills [Dry Eyes] : no dryness [Blurred Vision] : no blurred vision [Eyes Itch] : no itch [Dysphagia] : no dysphagia [Hearing Loss] : no hearing loss  [Chest Pain] : no chest pain [Palpitations] : no palpitations [Lower Ext Edema] : no lower extremity edema [Shortness Of Breath] : no shortness of breath [Cough] : no cough [Wheezing] : no wheezing [SOB on Exertion] : no shortness of breath on exertion [Nausea] : no nausea [Constipation] : no constipation [Heartburn] : no heartburn [Diarrhea] : no diarrhea [Polyuria] : no polyuria [Dysuria] : no dysuria [Muscle Weakness] : no muscle weakness [Myalgia] : no myalgia  [Muscle Cramps] : no muscle cramps [Hair Loss] : no hair loss [Ulcer] : no ulcer [Headaches] : no headaches [Tremors] : no tremors [Pain/Numbness of Digits] : no pain/numbness of digits [Depression] : no depression [Insomnia] : no insomnia [Stress] : no stress [Polydipsia] : no polydipsia [Cold Intolerance] : no cold intolerance [Heat Intolerance] : no heat intolerance [Easy Bleeding] : no ~M tendency for easy bleeding [Easy Bruising] : no tendency for easy bruising [FreeTextEntry2] : Has lost 3-4 lb since her last visit [FreeTextEntry8] : Nocturia once a night.  Has occasional urge incontinence during the day [FreeTextEntry9] : See HPI re: pain in her knees and R wrist.  Has not had any problems with her shoulders

## 2020-11-01 NOTE — ASSESSMENT
[FreeTextEntry1] : 1) Type 2 DM:  Glycemic control is excellent by A1c level, though the value is surprisingly low for her fingerstick results, and I would have had some concern about the adequacy of control after breakfast, paola if she had both oatmeal and half a sandwich at the meal.  \par --To continue monotherapy with metformin\par --Encouraged her to have only the oatmeal or sandwich at breakfast, not both\par --Needs to monitor more frequently two hours after meals, not just pre-breakfast and supper\par \par 2) Hypercholesterolemia:  LDL-cholesterol is only slightly above her goal of 70 mg%.\par --Continue rosuvastatin 20 mg/day\par \par 3) Hypothyroidism:  TSH level is in the optimal range of 0.4-2.5 uU/ml\par --Continue levothyroxine 100 mcg 6 days/week\par \par 4) Hypertension:  BP is excellent at today's visit, but would prefer that this be supplemented with readings checked at home.\par --Continue losartan 25 mg/day\par --Encouraged the pt to monitor twice a week at home\par \par Strongly suggested to the pt that she ask her cardiologist for a referral to a sleep center in her area.  She is high-risk for sleep apnea based on her symptoms.  (Had also fallen asleep in the waiting room today while waiting to see me this morning).\par \par See for follow-up in 4 months.  CMP, lipids, A1c, fructosamine, TFTs, microalbumin before the visit [FreeTextEntry2] : Diet, post-prandial FS monitoring, home BP monitoring, risks of undiagnosed sleep apnea

## 2020-11-01 NOTE — PHYSICAL EXAM
[Alert] : alert [No Acute Distress] : no acute distress [Normal Sclera/Conjunctiva] : normal sclera/conjunctiva [EOMI] : extra ocular movement intact [PERRL] : pupils equal, round and reactive to light [No Proptosis] : no proptosis [No Lid Lag] : no lid lag [Normal Outer Ear/Nose] : the ears and nose were normal in appearance [Normal Hearing] : hearing was normal [No Neck Mass] : no neck mass was observed [No LAD] : no lymphadenopathy [Thyroid Not Enlarged] : the thyroid was not enlarged [No Thyroid Nodules] : no palpable thyroid nodules [Clear to Auscultation] : lungs were clear to auscultation bilaterally [Normal Rate] : heart rate was normal [Regular Rhythm] : with a regular rhythm [Carotids Normal] : carotid pulses were normal with no bruits [No Edema] : no peripheral edema [Not Tender] : non-tender [Soft] : abdomen soft [No HSM] : no hepato-splenomegaly [Normal Supraclavicular Nodes] : no supraclavicular lymphadenopathy [Normal Anterior Cervical Nodes] : no anterior cervical lymphadenopathy [No CVA Tenderness] : no ~M costovertebral angle tenderness [No Involuntary Movements] : no involuntary movements were seen [No Rash] : no rash [No Tremors] : no tremors [Normal Sensation on Monofilament Testing] : normal sensation on monofilament testing of lower extremities [Normal Affect] : the affect was normal [Normal Mood] : the mood was normal [Acanthosis Nigricans] : no acanthosis nigricans [Foot Ulcers] : no foot ulcers [Hirsutism] : no hirsutism [de-identified] : Moderately obese [de-identified] : No corneal arcus or xanthelasma [de-identified] : Grade 2/6 BRIGIDO at the LSB and aortic area [de-identified] : DP pulses 3+ bilaterally [de-identified] : Obese [de-identified] : Mild swelling of both knees [de-identified] : Mild dorsal kyphosis [de-identified] : Vibratory sensation moderately decreased over the toes

## 2021-01-12 RX ORDER — LOSARTAN POTASSIUM 25 MG/1
25 TABLET, FILM COATED ORAL
Qty: 90 | Refills: 3 | Status: ACTIVE | COMMUNITY
Start: 2020-02-07 | End: 1900-01-01

## 2021-02-25 ENCOUNTER — APPOINTMENT (OUTPATIENT)
Dept: ENDOCRINOLOGY | Facility: CLINIC | Age: 86
End: 2021-02-25
Payer: MEDICARE

## 2021-02-25 VITALS
DIASTOLIC BLOOD PRESSURE: 70 MMHG | OXYGEN SATURATION: 96 % | WEIGHT: 169 LBS | BODY MASS INDEX: 31.91 KG/M2 | SYSTOLIC BLOOD PRESSURE: 138 MMHG | HEIGHT: 61 IN | TEMPERATURE: 97.9 F | HEART RATE: 69 BPM

## 2021-02-25 PROCEDURE — 99214 OFFICE O/P EST MOD 30 MIN: CPT

## 2021-02-28 NOTE — DATA REVIEWED
[FreeTextEntry1] : [facility]  (2/22/21)\par \par ,  A1c 5.7%\par CMP WNL\par LDL 62, HDL 39, TG 98\par TSH 1.43 uU/ml  (0.4-4.5)\par Urine microalbumin ratio negative at 7.0 (normal < 30)

## 2021-02-28 NOTE — PHYSICAL EXAM
[Alert] : alert [No Acute Distress] : no acute distress [Normal Sclera/Conjunctiva] : normal sclera/conjunctiva [EOMI] : extra ocular movement intact [PERRL] : pupils equal, round and reactive to light [No Proptosis] : no proptosis [No Lid Lag] : no lid lag [Normal Outer Ear/Nose] : the ears and nose were normal in appearance [No Neck Mass] : no neck mass was observed [No LAD] : no lymphadenopathy [Thyroid Not Enlarged] : the thyroid was not enlarged [No Thyroid Nodules] : no palpable thyroid nodules [Clear to Auscultation] : lungs were clear to auscultation bilaterally [Normal Rate] : heart rate was normal [Regular Rhythm] : with a regular rhythm [Carotids Normal] : carotid pulses were normal with no bruits [No Edema] : no peripheral edema [Not Tender] : non-tender [Soft] : abdomen soft [Normal Supraclavicular Nodes] : no supraclavicular lymphadenopathy [Normal Anterior Cervical Nodes] : no anterior cervical lymphadenopathy [No CVA Tenderness] : no ~M costovertebral angle tenderness [No Involuntary Movements] : no involuntary movements were seen [No Joint Swelling] : no joint swelling seen [No Rash] : no rash [No Tremors] : no tremors [Normal Sensation on Monofilament Testing] : normal sensation on monofilament testing of lower extremities [Normal Affect] : the affect was normal [Normal Mood] : the mood was normal [Acanthosis Nigricans] : no acanthosis nigricans [Foot Ulcers] : no foot ulcers [de-identified] : Mildly obese [de-identified] : No corneal arcus or xanthelasma [de-identified] : Hearing is mildly impaired [de-identified] : Grade 2/6 BRIGIDO at the LSB and aortic area [de-identified] : DP pulses 3+ bilaterally [de-identified] : Liver edge 2-3 FB below the RCM [de-identified] : Mild dorsal kyphosis [de-identified] : Mild LE weakness proximally, mostly on the left [de-identified] : Hyperalgesia over the left lower leg, without any distinct changes in skin color or temperature [de-identified] : Vibratory sensation moderately decreased over the toes

## 2021-02-28 NOTE — ASSESSMENT
[FreeTextEntry1] : 1) Type 2 DM:  Glycemic control is excellent as assessed by both A1c level and fingerstick monitoring.  Essentially all of her fingersticks are in target range except when she has dessert after supper.\par --Continue monotherapy with metformin\par --Continue fingerstick monitoring twice a day\par --Diet reinforced.\par --Needs to start a daily walking program--she acknowledges this\par \par 2) Hypothyroidism:  TSH level is in the optimal range of 0.4-2.5 uU/ml\par --Continue levothyroxine 100 mcg 6 days/week\par \par 3) Hypercholesterolemia:  LDL-cholesterol is below her target of 70 mg%\par --Continue rosuvastatin 20 mg/day\par \par 4) Hypertension:  BP is satisfactory at today's visit but she needs to supplement this with readings checked at home\par --Continue losartan 25 mg/day\par --Resume home monitoring 1-2X/week--target of < 130-140/80-85 discussed\par \par 5) Cognitive dysfunction:  She continues to complain of "memory problems."  Suggested that she speak to her PCP about a Neurology referral.

## 2021-02-28 NOTE — HISTORY OF PRESENT ILLNESS
[FreeTextEntry1] : 85-year-old woman who is followed for type 2 DM, hypothyroidism, hyperlipidemia and hypertension.  She has multiple other medical problems which include lumbar spinal stenosis, osteoarthritis of her hands and knees, valvular heart disease (aortic sclerosis with mild AS, also mild MR), irritable bladder and bilateral rotator cuff tears (with previous arthroscopic surgery on her left shoulder in 2017).  She also has a history of nephrolithiasis\par \par Fell on the small stairway outside her house a few weeks ago.  Slipped on the ice, fell backward, landed on her buttocks, but says "her whole body hurt" for several days afterward.\par Her only other significant complaint is dry mouth. Was advised by her dentist to start using Biotene toothpaste, but found that this was irritating the inside of her lip.\par She has not yet received COVID vaccine--"heard stories" that people  of "natural causes" two weeks after the second dose.\par \par Doing fingersticks twice a day.  Pre-breakfast values are mostly in the 130-140 range, post-prandials are below 150 mg%.  She will occasionally have fruit or a small dessert after the post-supper fingerstick, and can see minor spikes to 180-190.\par Current diet:\par --Breakfast is usually oatmeal--but includes "all sorts of things you would not believe"--berries, craisins, garlic, cinnamon, etc\par --Will often skip lunch.  If she does eat lunch, it will be the main meal of the day.  Protein at the main meal is fish or chicken, starch is most often buckwheat, occasionally brown rice.\par Weight is about the same\par Has not been monitoring BPs at home. \par Will be seeing her ophthalmologist next week.

## 2021-06-24 ENCOUNTER — APPOINTMENT (OUTPATIENT)
Dept: ENDOCRINOLOGY | Facility: CLINIC | Age: 86
End: 2021-06-24
Payer: MEDICARE

## 2021-06-24 VITALS
SYSTOLIC BLOOD PRESSURE: 118 MMHG | DIASTOLIC BLOOD PRESSURE: 68 MMHG | OXYGEN SATURATION: 97 % | HEART RATE: 72 BPM | BODY MASS INDEX: 31.15 KG/M2 | WEIGHT: 165 LBS | TEMPERATURE: 97.5 F | HEIGHT: 61 IN

## 2021-06-24 PROCEDURE — 99214 OFFICE O/P EST MOD 30 MIN: CPT

## 2021-06-27 RX ORDER — ROSUVASTATIN CALCIUM 20 MG/1
20 TABLET, FILM COATED ORAL DAILY
Qty: 90 | Refills: 3 | Status: DISCONTINUED | COMMUNITY
Start: 2017-11-11 | End: 2021-06-27

## 2021-06-27 NOTE — PHYSICAL EXAM
[Alert] : alert [No Acute Distress] : no acute distress [Normal Sclera/Conjunctiva] : normal sclera/conjunctiva [EOMI] : extra ocular movement intact [PERRL] : pupils equal, round and reactive to light [No Proptosis] : no proptosis [No Lid Lag] : no lid lag [Normal Outer Ear/Nose] : the ears and nose were normal in appearance [Normal Hearing] : hearing was normal [No Neck Mass] : no neck mass was observed [No LAD] : no lymphadenopathy [Normal Rate and Effort] : normal respiratory rate and effort [Normal Rate] : heart rate was normal [Regular Rhythm] : with a regular rhythm [No Edema] : no peripheral edema [Not Tender] : non-tender [Soft] : abdomen soft [No HSM] : no hepato-splenomegaly [Normal Supraclavicular Nodes] : no supraclavicular lymphadenopathy [Normal Anterior Cervical Nodes] : no anterior cervical lymphadenopathy [No CVA Tenderness] : no ~M costovertebral angle tenderness [No Involuntary Movements] : no involuntary movements were seen [No Rash] : no rash [No Tremors] : no tremors [Normal Sensation on Monofilament Testing] : normal sensation on monofilament testing of lower extremities [Normal Affect] : the affect was normal [Normal Mood] : the mood was normal [Acanthosis Nigricans] : no acanthosis nigricans [Foot Ulcers] : no foot ulcers [de-identified] : Mildly obese [de-identified] : No corneal arcus or xanthelasma [de-identified] : Thyroid gland at the upper limits of normal in size, firm and somewhat irregular in consistency [de-identified] : Fine crackles at the left base [de-identified] : Grade 2/6 BRIGIDO at the LSB and aortic area [de-identified] : Transmitted murmur or intrinsic bruits over both carotids.  DP pulses 3+ bilaterally [de-identified] : Mild swelling of her R knee [de-identified] : Mild dorsal kyphosis [de-identified] : Pigmented 5 mm lesion on the dorsum of the R hand  [de-identified] : Vibratory sensation absent over the toes and malleoli

## 2021-06-27 NOTE — HISTORY OF PRESENT ILLNESS
[FreeTextEntry1] : 85-year-old woman who is followed for type 2 DM, hypothyroidism, hyperlipidemia and hypertension.  She has multiple other medical problems which include lumbar spinal stenosis, osteoarthritis of her hands and knees, valvular heart disease (aortic sclerosis with mild AS, also mild MR), irritable bladder and bilateral rotator cuff tears (with previous arthroscopic surgery on her left shoulder in 2017).  She also has a history of nephrolithiasis\par \anne-marie Finally decided to get COVID vaccine--received Calin and Calin last month, had no reaction after the injection..  \anne-marie May have had a minor respiratory tract infection a few months ago--did not get tested for COVID\par Pre-breakfast fingersticks have been 120-130.  Post-prandials are < 140, but many are taken more than two hours after the meal.\par Diet reviewed:\par --Breakfast is either oatmeal or half a sandwich on dark bread (garlic, 1 slice of cheese, 1 slice of some type of meat)\par --Lunch is the main meal of the day.  Protein is chicken or fish.  Starch is either buckwheat or brown rice.  \par --Third meal is just fruit--either two mandarins or an apple. \par Last ophth exam was 2 months ago and was apparently negative\anne-marie Has subjective numbness and paresthesias in the first toes of both feet.\anne-marie Had Synvisc injections in her R knee a few weeks ago with partial relief of her usual pain

## 2021-06-27 NOTE — ASSESSMENT
[FreeTextEntry1] : 1) Type 2 DM:  Glycemic control is excellent by A1c level, and her fingersticks appear to be in target range--(though many of the post-prandial readings are taken more than 2 hours after the meal).  She has lost 4 lb in weight since her last visit.  The only potential problem with her diet is the amount of fruit which she sometimes has at night\par --Continue metformin- mg BID\par --Needs to focus her monitoring on readings after lunch (because it is the largest meal of the day) and after supper (because it is only fruit).  Also needs to try to do these at the 2-hr time point.\par \par 2) Hypothyroidism:  TSH level is only a trace above the optimal range of 0.4-2.5 uU/ml.\par --Continue levothyroxine 100 mcg 6 days/week\par \par 3) Hyperlipidemia:  LDL-cholesterol level is somewhat above her target of 70 mg%.\par --To increase the rosuvastatin to 40 mg/day\par \par 4) Hypertension:  BP is excellent at today's visit, but she needs to increase her monitoring at home\par --Continue losartan 25 mg/day\par \par See for follow-up in 4 months.  CMP, lipids, A1c, TFTs, microalb before the visit [FreeTextEntry2] : Diet, timing of fingersticks

## 2021-06-27 NOTE — DATA REVIEWED
[FreeTextEntry1] : [facility]  (6/19/21)\par \par ,  A1c 5.8%\par CMP and CBC WNL\par LDL 83, HDL 49, TG 83\par TSH 2.51 uU/ml  (0.4-4.5)

## 2021-06-27 NOTE — REVIEW OF SYSTEMS
[Fatigue] : fatigue [Difficulty Walking] : difficulty walking [Decreased Appetite] : appetite not decreased [Fever] : no fever [Chills] : no chills [Dry Eyes] : no dryness [Blurred Vision] : no blurred vision [Eyes Itch] : no itch [Dysphagia] : no dysphagia [Hearing Loss] : no hearing loss  [Chest Pain] : no chest pain [Palpitations] : no palpitations [Lower Ext Edema] : no lower extremity edema [Shortness Of Breath] : no shortness of breath [Cough] : no cough [Wheezing] : no wheezing [SOB on Exertion] : no shortness of breath on exertion [Nausea] : no nausea [Constipation] : no constipation [Heartburn] : no heartburn [Diarrhea] : no diarrhea [Polyuria] : no polyuria [Dysuria] : no dysuria [Muscle Weakness] : no muscle weakness [Myalgia] : no myalgia  [Dry Skin] : no dry skin [Hair Loss] : no hair loss [Ulcer] : no ulcer [Headaches] : no headaches [Tremors] : no tremors [Depression] : no depression [Anxiety] : no anxiety [Stress] : no stress [Polydipsia] : no polydipsia [Cold Intolerance] : no cold intolerance [Heat Intolerance] : no heat intolerance [Easy Bleeding] : no ~M tendency for easy bleeding [Easy Bruising] : no tendency for easy bruising [FreeTextEntry2] : Has lost 4 lb since her last visit [FreeTextEntry4] : Dry mouth is unchanged [FreeTextEntry9] : Pain in both knees (R > L). shoulders, neck and lower back [FreeTextEntry8] : Nocturia once a night [de-identified] : Paresthesias in her first toes, mostly at night

## 2021-10-25 ENCOUNTER — APPOINTMENT (OUTPATIENT)
Dept: ENDOCRINOLOGY | Facility: CLINIC | Age: 86
End: 2021-10-25
Payer: MEDICARE

## 2021-10-25 VITALS
HEIGHT: 61 IN | TEMPERATURE: 97.7 F | DIASTOLIC BLOOD PRESSURE: 75 MMHG | WEIGHT: 169 LBS | BODY MASS INDEX: 31.91 KG/M2 | OXYGEN SATURATION: 95 % | SYSTOLIC BLOOD PRESSURE: 122 MMHG | HEART RATE: 68 BPM

## 2021-10-25 PROCEDURE — 99214 OFFICE O/P EST MOD 30 MIN: CPT

## 2021-10-29 NOTE — PHYSICAL EXAM
[Alert] : alert [No Acute Distress] : no acute distress [Normal Sclera/Conjunctiva] : normal sclera/conjunctiva [EOMI] : extra ocular movement intact [PERRL] : pupils equal, round and reactive to light [No Proptosis] : no proptosis [No Lid Lag] : no lid lag [Normal Outer Ear/Nose] : the ears and nose were normal in appearance [Normal Hearing] : hearing was normal [No Neck Mass] : no neck mass was observed [No LAD] : no lymphadenopathy [Normal Rate and Effort] : normal respiratory rate and effort [Normal Rate] : heart rate was normal [Regular Rhythm] : with a regular rhythm [No Edema] : no peripheral edema [Not Tender] : non-tender [Soft] : abdomen soft [No HSM] : no hepato-splenomegaly [Normal Supraclavicular Nodes] : no supraclavicular lymphadenopathy [Normal Anterior Cervical Nodes] : no anterior cervical lymphadenopathy [No CVA Tenderness] : no ~M costovertebral angle tenderness [No Involuntary Movements] : no involuntary movements were seen [No Rash] : no rash [No Tremors] : no tremors [Normal Sensation on Monofilament Testing] : normal sensation on monofilament testing of lower extremities [Normal Affect] : the affect was normal [Normal Mood] : the mood was normal [Acanthosis Nigricans] : no acanthosis nigricans [Foot Ulcers] : no foot ulcers [de-identified] : Mildly obese [de-identified] : No corneal arcus or xanthelasma [de-identified] : Thyroid gland at the upper limits of normal in size, firm and somewhat irregular in consistency [de-identified] : Fine crackles at the left base [de-identified] : Grade 2/6 BRIGIDO at the LSB and aortic area [de-identified] : Transmitted murmur or intrinsic bruits over both carotids.  DP pulses 3+ bilaterally [de-identified] : Mild dorsal kyphosis [de-identified] : Mild swelling of her R knee [de-identified] : Pigmented 5 mm lesion on the dorsum of the R hand  [de-identified] : Vibratory sensation absent over the toes and malleoli

## 2021-10-29 NOTE — ASSESSMENT
[FreeTextEntry1] : \anne-marie --Was instructed in use of her new [employer] machine.  Reading on this was 118/71--i.e. very close to what the med tech got when she arrived for her visit

## 2021-10-29 NOTE — REVIEW OF SYSTEMS
[Fatigue] : fatigue [Difficulty Walking] : difficulty walking [Decreased Appetite] : appetite not decreased [Fever] : no fever [Chills] : no chills [Dry Eyes] : no dryness [Blurred Vision] : no blurred vision [Eyes Itch] : no itch [Dysphagia] : no dysphagia [Hearing Loss] : no hearing loss  [Chest Pain] : no chest pain [Palpitations] : no palpitations [Lower Ext Edema] : no lower extremity edema [Shortness Of Breath] : no shortness of breath [Wheezing] : no wheezing [SOB on Exertion] : no shortness of breath on exertion [Nausea] : no nausea [Constipation] : no constipation [Heartburn] : no heartburn [Diarrhea] : no diarrhea [Polyuria] : no polyuria [Dysuria] : no dysuria [Muscle Weakness] : no muscle weakness [Myalgia] : no myalgia  [Dry Skin] : no dry skin [Hair Loss] : no hair loss [Ulcer] : no ulcer [Headaches] : no headaches [Tremors] : no tremors [Pain/Numbness of Digits] : no pain/numbness of digits [Depression] : no depression [Anxiety] : no anxiety [Stress] : no stress [Polydipsia] : no polydipsia [Cold Intolerance] : no cold intolerance [Heat Intolerance] : no heat intolerance [Easy Bleeding] : no ~M tendency for easy bleeding [Easy Bruising] : no tendency for easy bruising [FreeTextEntry2] : Has gained back the 4 lb which she had lost prior to her last visit [FreeTextEntry4] : Dry mouth is unchanged [FreeTextEntry6] : Has an occasional dry cough [FreeTextEntry8] : Nocturia once a night.  No undue frequency during the day on Vesicare [FreeTextEntry9] : Multiple arthralgias--"everything hurts"

## 2021-10-29 NOTE — HISTORY OF PRESENT ILLNESS
[FreeTextEntry1] : 85-year-old woman who is followed for type 2 DM, hypothyroidism, hyperlipidemia and hypertension.  She has multiple other medical problems which include lumbar spinal stenosis, osteoarthritis of her hands and knees, valvular heart disease (aortic sclerosis with mild AS, also mild MR), irritable bladder and bilateral rotator cuff tears (with previous arthroscopic surgery on her left shoulder in 2017).  She also has a history of nephrolithiasis\par \anne-marie Has not been able to monitor fingersticks--says that her machine (an old Accu-Chek Compact) is giving her "crazy readings."  She has been unable to replace the meter itself.\par Her only new physical complaint is more difficulty with "balance"\anne-marie Has problems with dry mouth, and was told by her dentist that this was responsible for her dental fillings falling out.  She thinks that the dry mouth is from the Vesicare and tried stopping it.  Had partial improvement with her mouth symptoms, but her urinary frequency increased markedly.\par Also complaints of diffuse arthralgias--"everything hurts"--"I'm one big ache"\par Ophth exam two months ago was negative.\par Denies any numbness or paresthesias in her toes

## 2021-11-24 RX ORDER — LANCETS
EACH MISCELLANEOUS
Qty: 100 | Refills: 3 | Status: ACTIVE | COMMUNITY
Start: 2021-11-24 | End: 1900-01-01

## 2022-04-07 ENCOUNTER — APPOINTMENT (OUTPATIENT)
Dept: ENDOCRINOLOGY | Facility: CLINIC | Age: 87
End: 2022-04-07
Payer: MEDICARE

## 2022-04-07 VITALS
SYSTOLIC BLOOD PRESSURE: 129 MMHG | WEIGHT: 169 LBS | DIASTOLIC BLOOD PRESSURE: 75 MMHG | HEART RATE: 70 BPM | BODY MASS INDEX: 31.91 KG/M2 | HEIGHT: 61 IN | TEMPERATURE: 97.7 F | OXYGEN SATURATION: 96 %

## 2022-04-07 PROCEDURE — 99214 OFFICE O/P EST MOD 30 MIN: CPT

## 2022-04-10 RX ORDER — NITROFURANTOIN MACROCRYSTALS 50 MG/1
50 CAPSULE ORAL
Refills: 0 | Status: DISCONTINUED | COMMUNITY
Start: 2018-03-09 | End: 2022-04-10

## 2022-04-10 NOTE — HISTORY OF PRESENT ILLNESS
[FreeTextEntry1] : 86-year-old woman who is followed for type 2 DM, hypothyroidism, hyperlipidemia and hypertension.  She has multiple other medical problems which include lumbar spinal stenosis, osteoarthritis of her hands and knees, valvular heart disease (aortic sclerosis with mild AS, also mild MR), irritable bladder and bilateral rotator cuff tears (with previous arthroscopic surgery on her left shoulder in 2017).  She also has a history of nephrolithiasis\par \par Has not had any acute illnesses since her last visit, but fell in January--occurred while she was climbing stairs, and she fell backward and struck her head.  Did not lose consciousness.  Was seen in the local ER, and CT head was negative\par Her only new complaint is intermittent dysphagia, with a sensation of food (even liquids) getting stuck in her mid-esophagus.  Happens only intermittently, and she seems to be fine between episodes\par Her brother had a severe case of COVID despite having been vaccinated.  Was hospitalized with PNA, did not require intubation, but still needs supplemental O2\par Has obtained the new Accu-Chek (Guide) meter, but is using her old lancet device because the lancets do not fit in the new automatic lancet.\par Fingersticks in the morning are running near 120 mg%, and the occasional post-prandials which she has tested have been < 140.\par Current diet:\par --Breakfast (actually brunch, since she does not usually eat until midday) is either oatmeal (with berries) or a sandwich\par --Eats her main meal at 5 PM.  Protein is fish 3X/week, otherwise poultry.  Starch is usually buckwheat noodles\par --Eats only fruit at night--1 1/2 mandarins or an apple\par Has not been testing BPs at home.

## 2022-04-10 NOTE — REASON FOR VISIT
[Follow - Up] : a follow-up visit [Hypothyroidism] : hypothyroidism [DM Type 2] : DM Type 2 [Other___] : [unfilled]

## 2022-04-10 NOTE — REVIEW OF SYSTEMS
[Fatigue] : fatigue [Difficulty Walking] : difficulty walking [Poor Balance] : poor balance [Decreased Appetite] : appetite not decreased [Recent Weight Gain (___ Lbs)] : no recent weight gain [Recent Weight Loss (___ Lbs)] : no recent weight loss [Fever] : no fever [Chills] : no chills [Dry Eyes] : no dryness [Blurred Vision] : no blurred vision [Eyes Itch] : no itch [Hearing Loss] : no hearing loss  [Chest Pain] : no chest pain [Palpitations] : no palpitations [Lower Ext Edema] : no lower extremity edema [Shortness Of Breath] : no shortness of breath [Cough] : no cough [Wheezing] : no wheezing [SOB on Exertion] : no shortness of breath on exertion [Nausea] : no nausea [Constipation] : no constipation [Heartburn] : no heartburn [Diarrhea] : no diarrhea [Polyuria] : no polyuria [Dysuria] : no dysuria [Nocturia] : no nocturia [Myalgia] : no myalgia  [Muscle Weakness] : no muscle weakness [Dry Skin] : no dry skin [Hair Loss] : no hair loss [Ulcer] : no ulcer [Headaches] : no headaches [Tremors] : no tremors [Depression] : no depression [Anxiety] : no anxiety [Stress] : no stress [Polydipsia] : no polydipsia [Cold Intolerance] : no cold intolerance [Heat Intolerance] : no heat intolerance [Easy Bleeding] : no ~M tendency for easy bleeding [Easy Bruising] : no tendency for easy bruising [FreeTextEntry4] : Dry mouth is less noticeable since she stopped the Vesicare.  See comments in the HPI re: intermittent dysphagia [FreeTextEntry9] : Multiple arthralgias--"everything hurts"--mostly shoulders and knees [FreeTextEntry8] : Increased urinary frequency and intermittent urge incontinence when she is off the Vesicare [de-identified] : Has subjective numbness in her toes

## 2022-04-10 NOTE — ASSESSMENT
[FreeTextEntry1] : 1) Type 2 DM:  Glycemic control is excellent by A1c level, and her fingersticks have been in target range.\par --Continue metformin 500 mg BID\par --Encouraged her to focus her fingerstick monitoring on post-prandial values\par --Explained to her that I had sent a prescription for the lancets which fit in the lancing device for the Guide meter.  She needs to check with the pharmacy about this\par --Diet reviewed and discussed--no significant modification is necessary\par \par 2) Hyperlipidemia:  LDL-cholesterol level is below her target of 70 mg%\par --Continue rosuvastatin 40 mg/day\par \par 3) Hypothyroidism:  TSH level is in the optimal range of 0.4-2.5 uU/ml\par --Continue levothyroxine 100 mcg 6 days/week\par \par 4) Hypertension:  BP is excellent at today's visit, but would prefer that this be supplemented with readings checked at home.\par --Continue losartan\par --Encouraged her to resume BP monitoring twice a week at home\par \par Needs to see Dr. Mariano for follow-up of her problems with the irritable bladder\par Suggested that she ask her PCP for referral to a local neurologist to evaluate her complaint of "balance problems"\par Also needs to see her gastroenterologist about the recent dysphagia--though this is intermittent, and ?? is due either to reflux or to esophageal spasm\par \par See for follow-up in 4 months.  CMP, lipids, A1c, TFTs, microalb before the visit [FreeTextEntry2] : Diet, post-prandial monitoring, BP monitoring

## 2022-04-10 NOTE — DATA REVIEWED
[FreeTextEntry1] : [facility]  (4/4/22)\par \par , A1c 5.6%\par CMP WNL\par Urine microalbumin ratio negative at 12   (normal < 30)\par LDL 61, HDL 40, \par TSH 1.97 uU/ml  (0.4-4.5)

## 2022-04-10 NOTE — PHYSICAL EXAM
[Alert] : alert [No Acute Distress] : no acute distress [Normal Sclera/Conjunctiva] : normal sclera/conjunctiva [EOMI] : extra ocular movement intact [PERRL] : pupils equal, round and reactive to light [No Proptosis] : no proptosis [No Lid Lag] : no lid lag [Normal Outer Ear/Nose] : the ears and nose were normal in appearance [Normal Hearing] : hearing was normal [No Neck Mass] : no neck mass was observed [No LAD] : no lymphadenopathy [Normal Rate and Effort] : normal respiratory rate and effort [Clear to Auscultation] : lungs were clear to auscultation bilaterally [Normal Rate] : heart rate was normal [Regular Rhythm] : with a regular rhythm [No Edema] : no peripheral edema [Not Tender] : non-tender [Soft] : abdomen soft [No HSM] : no hepato-splenomegaly [Normal Supraclavicular Nodes] : no supraclavicular lymphadenopathy [Normal Anterior Cervical Nodes] : no anterior cervical lymphadenopathy [No CVA Tenderness] : no ~M costovertebral angle tenderness [No Involuntary Movements] : no involuntary movements were seen [No Rash] : no rash [Normal] : normal [1+] : 1+ in the posterior tibialis [2+] : 2+ in the dorsalis pedis [Vibration Dec.] : diminished vibratory sensation at the level of the toes [No Tremors] : no tremors [Normal Sensation on Monofilament Testing] : normal sensation on monofilament testing of lower extremities [Normal Affect] : the affect was normal [Normal Mood] : the mood was normal [Foot Ulcers] : no foot ulcers [Acanthosis Nigricans] : no acanthosis nigricans [Delayed in the Right Toes] : normal in the toes [Delayed in the Left Toes] : normal in the toes [Position Sense Dec.] : normal position sense at the level of the toes [#1 Diminished] : number 1 was normal [#2 Diminished] : number 2 was normal [#3 Diminished] : number 3 was normal [#4 Diminished] : number 4 was normal [#5 Diminished] : number 5 was normal [#7 Diminished] : number 7 was normal [#6 Diminished] : number 6 was normal [#8 Diminished] : number 8 was normal [#9 Diminished] : number 9 was normal [#10 Diminished] : number 10 was normal [de-identified] : Mildly obese [de-identified] : No corneal arcus or xanthelasma [de-identified] : Thyroid gland at the upper limits of normal in size, firm and somewhat irregular in consistency [de-identified] : Transmitted murmur or intrinsic bruits over both carotids.  DP pulses 2+ bilaterally [de-identified] : Grade 2/6 BRIGIDO at the LSB and aortic area [de-identified] : Mild dorsal kyphosis [de-identified] : Mild swelling of her R knee [de-identified] : Pigmented 5 mm lesion on the dorsum of the R hand  [de-identified] : Vibratory sensation absent over the toes and malleoli

## 2022-08-26 ENCOUNTER — RX RENEWAL (OUTPATIENT)
Age: 87
End: 2022-08-26

## 2022-08-31 ENCOUNTER — RX RENEWAL (OUTPATIENT)
Age: 87
End: 2022-08-31

## 2022-09-07 ENCOUNTER — APPOINTMENT (OUTPATIENT)
Dept: ENDOCRINOLOGY | Facility: CLINIC | Age: 87
End: 2022-09-07

## 2022-09-07 VITALS
WEIGHT: 171 LBS | SYSTOLIC BLOOD PRESSURE: 166 MMHG | HEART RATE: 101 BPM | BODY MASS INDEX: 32.28 KG/M2 | TEMPERATURE: 98 F | OXYGEN SATURATION: 99 % | DIASTOLIC BLOOD PRESSURE: 78 MMHG | HEIGHT: 61 IN

## 2022-09-07 VITALS
HEIGHT: 61 IN | WEIGHT: 171 LBS | BODY MASS INDEX: 32.28 KG/M2 | TEMPERATURE: 98 F | OXYGEN SATURATION: 99 % | HEART RATE: 101 BPM | DIASTOLIC BLOOD PRESSURE: 78 MMHG | SYSTOLIC BLOOD PRESSURE: 168 MMHG

## 2022-09-07 PROCEDURE — 99214 OFFICE O/P EST MOD 30 MIN: CPT

## 2022-09-11 NOTE — ASSESSMENT
[FreeTextEntry1] : 1) Type 2 DM:  Glycemic control is excellent by A1c level, though would prefer that this be supplemented by fingerstick monitoring.  She has been very adherent to her dietary restrictions, though needs to be careful about the fruit at night\par --Continue metformin 500 mg BID\par --Encouraged her to resume fingerstick monitoring, even if only once a day.  Focus on readings taken after her main meal and after the fruit at night\par \par 2) Hypothyroidism:\par --Continue levothyroxine 100 mcg 6 days/week\par \par 3) Hyperlipidemia:  LDL-cholesterol level is markedly elevated with her having lapsed on the rosuvastatin.  She has since restarted it\par --Continue rosuvastatin 40 mg/day\par --Target LDL-cholesterol level is 70 mg%.  Will add Zetia if she is not at goal on her next bloodwork\par \par 4) Hypertension:  BP is moderately elevated at today's visit (?? secondary to a stressful trip to the office), but her readings at home have been in an excellent range\par --Continue losartan 25 mg/day\par \par See for follow-up in 4 months.  CMP, lipids, A1c, TFTs, CBC, microalb before the visit [FreeTextEntry2] : Diet, fingerstick monitoring, BP targets

## 2022-09-11 NOTE — PHYSICAL EXAM
[Alert] : alert [No Acute Distress] : no acute distress [Normal Sclera/Conjunctiva] : normal sclera/conjunctiva [EOMI] : extra ocular movement intact [PERRL] : pupils equal, round and reactive to light [No Proptosis] : no proptosis [No Lid Lag] : no lid lag [Normal Outer Ear/Nose] : the ears and nose were normal in appearance [Normal Hearing] : hearing was normal [No Neck Mass] : no neck mass was observed [No LAD] : no lymphadenopathy [Clear to Auscultation] : lungs were clear to auscultation bilaterally [Normal Rate] : heart rate was normal [Regular Rhythm] : with a regular rhythm [No Edema] : no peripheral edema [Not Tender] : non-tender [Soft] : abdomen soft [No HSM] : no hepato-splenomegaly [Normal Supraclavicular Nodes] : no supraclavicular lymphadenopathy [Normal Anterior Cervical Nodes] : no anterior cervical lymphadenopathy [No CVA Tenderness] : no ~M costovertebral angle tenderness [No Involuntary Movements] : no involuntary movements were seen [No Rash] : no rash [Normal] : normal [Vibration Dec.] : diminished vibratory sensation at the level of the toes [No Tremors] : no tremors [Normal Sensation on Monofilament Testing] : normal sensation on monofilament testing of lower extremities [Normal Affect] : the affect was normal [Normal Mood] : the mood was normal [No Joint Swelling] : no joint swelling seen [Normal Strength/Tone] : muscle strength and tone were normal [2+] : 2+ in the posterior tibialis [Acanthosis Nigricans] : no acanthosis nigricans [Foot Ulcers] : no foot ulcers [Delayed in the Right Toes] : normal in the toes [Delayed in the Left Toes] : normal in the toes [Position Sense Dec.] : normal position sense at the level of the toes [#1 Diminished] : number 1 was normal [#2 Diminished] : number 2 was normal [#3 Diminished] : number 3 was normal [#4 Diminished] : number 4 was normal [#5 Diminished] : number 5 was normal [#6 Diminished] : number 6 was normal [#7 Diminished] : number 7 was normal [#8 Diminished] : number 8 was normal [#9 Diminished] : number 9 was normal [#10 Diminished] : number 10 was normal [de-identified] : Mildly obese [de-identified] : No corneal arcus or xanthelasma [de-identified] : Thyroid gland is at the upper limits of normal in size, firm and somewhat irregular in consistency [de-identified] : Grade 2/6 BRIGIDO at the LSB and aortic area [de-identified] : Transmitted murmur or intrinsic bruit over the R carotid.  DP pulses 2+ bilaterally [de-identified] : Mild dorsal kyphosis [de-identified] : Still has not seen a dermatologist about the 5 mm lesion on the dorsum of the R hand  [de-identified] : Vibratory sensation absent over the toes and malleoli

## 2022-09-11 NOTE — HISTORY OF PRESENT ILLNESS
[FreeTextEntry1] : 86-year-old woman who is followed for type 2 DM, hypothyroidism, hyperlipidemia and hypertension.  She has multiple other medical problems which include lumbar spinal stenosis, osteoarthritis of her hands and knees, valvular heart disease (aortic sclerosis with mild AS, also mild MR), irritable bladder and bilateral rotator cuff tears (with previous arthroscopic surgery on her left shoulder in 2017).  She also has a history of nephrolithiasis\par \par Interim history since her last visit:\par --Had another mechanical fall last month.  Was wearing "the wrong shoes," and slipped in the parking lot of a local store.  Fell backward, struck her head, but did not go to the ER (even though she had a headache the day afterward)\par --Brother passed away from cardiac disease--(likely CHF)\par --Lapsed on her rosuvastatin for at least a month before her recent bloodwork.  (Also seems to have missed a few doses of levothyroxine before the bloodwork)\par \par Has not been monitoring fingersticks,  ?? because she has not learned how to use the [employer] lancing device\par BPs checked at home have been < 130/75.\par Current diet:\par --Breakfast is either oatmeal (with berries) or half a sandwich\par --Main meal is in the afternoon.  Fish is 4X/week (salmon, bass, sable), otherwise poultry or veal\par --Has only fruit in the evening\par Has mild subjective numbness in her toes, but no neuropathic pain\par Is up to date with ophth exams, which have been negative for retinopathy

## 2022-09-11 NOTE — REVIEW OF SYSTEMS
[Fatigue] : fatigue [Difficulty Walking] : difficulty walking [Poor Balance] : poor balance [Back Pain] : back pain [Decreased Appetite] : appetite not decreased [Fever] : no fever [Chills] : no chills [Dry Eyes] : no dryness [Blurred Vision] : no blurred vision [Eyes Itch] : no itch [Dysphagia] : no dysphagia [Hearing Loss] : no hearing loss  [Palpitations] : no palpitations [Lower Ext Edema] : no lower extremity edema [Shortness Of Breath] : no shortness of breath [Cough] : no cough [Wheezing] : no wheezing [SOB on Exertion] : no shortness of breath on exertion [Nausea] : no nausea [Constipation] : no constipation [Heartburn] : no heartburn [Diarrhea] : no diarrhea [Polyuria] : no polyuria [Dysuria] : no dysuria [Nocturia] : no nocturia [Muscle Weakness] : no muscle weakness [Myalgia] : no myalgia  [Dry Skin] : no dry skin [Hair Loss] : no hair loss [Ulcer] : no ulcer [Headaches] : no headaches [Tremors] : no tremors [Depression] : no depression [Anxiety] : no anxiety [Stress] : no stress [Polydipsia] : no polydipsia [Cold Intolerance] : no cold intolerance [Heat Intolerance] : no heat intolerance [Easy Bleeding] : no ~M tendency for easy bleeding [Easy Bruising] : no tendency for easy bruising [FreeTextEntry2] : Has gained 2 lb since her last visit [FreeTextEntry4] : Dry mouth has recurred since she restarted Vesicare. [FreeTextEntry5] : Occasional non-exertional chest tightness [FreeTextEntry8] : Urinary frequency has improved since she restarted the Vesicare [FreeTextEntry9] : Multiple arthralgias--"everything hurts"--mostly shoulders and knees [de-identified] : Has subjective numbness in her toes

## 2022-09-11 NOTE — DATA REVIEWED
[FreeTextEntry1] : [facility]  (9/3/22)\par \par ,  A1c 5.8% \par CMP WNL\par , HDL 40, \par Urine microalbumin ratio negative at 9.0  (normal < 30)\par Free T4 1.0 ng/dl  (0.8-1.8)\par TSH 6.23 uU/ml  (0.4-4.5)

## 2022-11-22 NOTE — REVIEW OF SYSTEMS
Detail Level: Detailed Quality 130: Documentation Of Current Medications In The Medical Record: Current Medications Documented Quality 402: Tobacco Use And Help With Quitting Among Adolescents: Patient screened for tobacco and never smoked Quality 431: Preventive Care And Screening: Unhealthy Alcohol Use - Screening: Patient screened for unhealthy alcohol use using a single question and scores less than 2 times per year [Hearing Loss] : hearing loss Quality 226: Preventive Care And Screening: Tobacco Use: Screening And Cessation Intervention: Patient screened for tobacco use and is an ex/non-smoker [SOB on Exertion] : shortness of breath on exertion [Dry Skin] : dry skin [Difficulty Walking] : difficulty walking [Back Pain] : back pain [Poor Balance] : poor balance [Fatigue] : no fatigue [Decreased Appetite] : appetite not decreased [Recent Weight Gain (___ Lbs)] : no recent weight gain [Recent Weight Loss (___ Lbs)] : no recent weight loss [Fever] : no fever [Chills] : no chills [Dry Eyes] : no dryness [Blurred Vision] : no blurred vision [Eyes Itch] : no itch [Dysphagia] : no dysphagia [Chest Pain] : no chest pain [Palpitations] : no palpitations [Lower Ext Edema] : no lower extremity edema [Shortness Of Breath] : no shortness of breath [Wheezing] : no wheezing [Nausea] : no nausea [Diarrhea] : no diarrhea [Gas/Bloating] : no gas/bloating [Polyuria] : no polyuria [Dysuria] : no dysuria [Nocturia] : no nocturia [Muscle Weakness] : no muscle weakness [Myalgia] : no myalgia  [Hair Loss] : no hair loss [Ulcer] : no ulcer [Headaches] : no headaches [Tremors] : no tremors [Depression] : no depression [Anxiety] : no anxiety [Stress] : no stress [Polydipsia] : no polydipsia [Cold Intolerance] : no cold intolerance [Heat Intolerance] : no heat intolerance [Easy Bleeding] : no ~M tendency for easy bleeding [Easy Bruising] : no tendency for easy bruising [FreeTextEntry4] : Chronic dry mouth [FreeTextEntry6] : Chronic cough, mostly in the morning, mildly productive [FreeTextEntry7] : Frequent reflux symptoms despite omeprazole.  Taking Colase for constipation [FreeTextEntry8] : Urinary frequency with mild urge incontinence during the day [FreeTextEntry9] : Moderate pain in both knees [de-identified] : S [de-identified] : Subjective numbness in her toes.  Continues to complain of "memory problems"

## 2022-12-15 RX ORDER — UBIDECARENONE/VIT E ACET 100MG-5
50 MCG CAPSULE ORAL
Qty: 90 | Refills: 3 | Status: ACTIVE | COMMUNITY
Start: 2021-10-25 | End: 1900-01-01

## 2023-01-18 ENCOUNTER — APPOINTMENT (OUTPATIENT)
Dept: ENDOCRINOLOGY | Facility: CLINIC | Age: 88
End: 2023-01-18
Payer: MEDICARE

## 2023-01-18 VITALS
TEMPERATURE: 97.7 F | BODY MASS INDEX: 31.53 KG/M2 | OXYGEN SATURATION: 96 % | SYSTOLIC BLOOD PRESSURE: 136 MMHG | WEIGHT: 167 LBS | HEART RATE: 78 BPM | HEIGHT: 61 IN | DIASTOLIC BLOOD PRESSURE: 80 MMHG

## 2023-01-18 PROCEDURE — 99214 OFFICE O/P EST MOD 30 MIN: CPT

## 2023-01-18 RX ORDER — LEVOTHYROXINE SODIUM 0.1 MG/1
100 TABLET ORAL
Qty: 90 | Refills: 3 | Status: ACTIVE | COMMUNITY
Start: 2017-11-09

## 2023-01-18 RX ORDER — BLOOD SUGAR DIAGNOSTIC
STRIP MISCELLANEOUS
Qty: 100 | Refills: 3 | Status: ACTIVE | COMMUNITY
Start: 2021-10-25 | End: 1900-01-01

## 2023-01-18 RX ORDER — BLOOD-GLUCOSE METER
W/DEVICE EACH MISCELLANEOUS
Qty: 1 | Refills: 2 | Status: ACTIVE | COMMUNITY
Start: 2021-10-25 | End: 1900-01-01

## 2023-01-18 RX ORDER — SOLIFENACIN SUCCINATE 5 MG/1
5 TABLET ORAL
Qty: 90 | Refills: 3 | Status: ACTIVE | COMMUNITY
Start: 2018-11-10 | End: 1900-01-01

## 2023-01-22 NOTE — ASSESSMENT
[FreeTextEntry2] : Diet, home BP monitoring [FreeTextEntry1] : 1) Type 2 DM:  A1c level is extremely low, and would ordinarily raise the suspicion of undetected hypoglycemia--but this is highly unlikely given her reported AM fingersticks of 120-130 and the fact that she is only on metformin.  The A1c level could well be a lab error.\par --To continue metformin\par --Rx was sent for a new glucometer and strips\par --Resume FS monitoring, focus on post-prandial values\par \par 2) Hypothyroidism:  TSH level is borderline elevated on the current labwork, but the pt admits to missing occasional levothyroxine doses.  Her TSH level on the labwork done by Dr. Pedroza in December was in the optimal range at 0.8 uU/ml.  Since she was taking the levothyroxine 7 days/week when she did the bloodwork in December, will have her continue this dose\par --Will continue levothyroxine 100 mcg 7 days/week\par \par 3) Hyperlipidemia:  LDL-cholesterol level is only a trace above her target of 70 mg% despite the missed doses of rosuvastatin.  Her diet is quite low in total and saturated fat.\par --Continue rosuvastatin 40 mg/day\par \par 4) Hypertension:  BP is satisfactory at today's visit, but she needs to resume BP monitoring at home\par --Continue losartan 25 mg/day\par \par See for follow-up in 4 months.  CMP, lipids, A1c, TFTs before the visit

## 2023-01-22 NOTE — PHYSICAL EXAM
[Alert] : alert [No Acute Distress] : no acute distress [Normal Sclera/Conjunctiva] : normal sclera/conjunctiva [EOMI] : extra ocular movement intact [PERRL] : pupils equal, round and reactive to light [No Proptosis] : no proptosis [No Lid Lag] : no lid lag [Normal Outer Ear/Nose] : the ears and nose were normal in appearance [Normal Hearing] : hearing was normal [No Neck Mass] : no neck mass was observed [No LAD] : no lymphadenopathy [Clear to Auscultation] : lungs were clear to auscultation bilaterally [Normal Rate] : heart rate was normal [Regular Rhythm] : with a regular rhythm [No Edema] : no peripheral edema [Not Tender] : non-tender [Soft] : abdomen soft [No HSM] : no hepato-splenomegaly [Normal Supraclavicular Nodes] : no supraclavicular lymphadenopathy [Normal Anterior Cervical Nodes] : no anterior cervical lymphadenopathy [No CVA Tenderness] : no ~M costovertebral angle tenderness [No Involuntary Movements] : no involuntary movements were seen [Normal Strength/Tone] : muscle strength and tone were normal [No Rash] : no rash [Normal] : normal [2+] : 2+ in the dorsalis pedis [Vibration Dec.] : diminished vibratory sensation at the level of the toes [No Tremors] : no tremors [Normal Sensation on Monofilament Testing] : normal sensation on monofilament testing of lower extremities [Normal Affect] : the affect was normal [Normal Mood] : the mood was normal [Acanthosis Nigricans] : no acanthosis nigricans [Foot Ulcers] : no foot ulcers [Delayed in the Right Toes] : normal in the toes [Delayed in the Left Toes] : normal in the toes [Position Sense Dec.] : normal position sense at the level of the toes [#1 Diminished] : number 1 was normal [#2 Diminished] : number 2 was normal [#3 Diminished] : number 3 was normal [#4 Diminished] : number 4 was normal [#5 Diminished] : number 5 was normal [#6 Diminished] : number 6 was normal [#7 Diminished] : number 7 was normal [#8 Diminished] : number 8 was normal [#9 Diminished] : number 9 was normal [#10 Diminished] : number 10 was normal [de-identified] : Mildly obese [de-identified] : No corneal arcus or xanthelasma [de-identified] : Thyroid gland is at the upper limits of normal in size [de-identified] : Grade 2/6 BRIGIDO at the LSB and aortic area [de-identified] : Transmitted murmur or intrinsic bruit over both carotids.  DP pulses 2+ bilaterally [de-identified] : Mild dorsal kyphosis [de-identified] : Mild swelling of the R knee [de-identified] : Lesion on the R hand was removed and was benign on pathology [de-identified] : Vibratory sensation severely decreased over the toes and malleoli

## 2023-01-22 NOTE — DATA REVIEWED
[FreeTextEntry1] : [facility]  (1/16/23)\par \par ,  A1c 5.7%\par CMP WNL\par Urine microalbumin ratio negative at 8.0  (normal < 30)\par LDL 76, HDL 48, TG 93\par TSH 4.44 uU/ml  (0.4-4.5)

## 2023-01-22 NOTE — REVIEW OF SYSTEMS
[Fatigue] : fatigue [Back Pain] : back pain [Difficulty Walking] : difficulty walking [Poor Balance] : poor balance [Decreased Appetite] : appetite not decreased [Fever] : no fever [Chills] : no chills [Dry Eyes] : no dryness [Blurred Vision] : no blurred vision [Eyes Itch] : no itch [Dysphagia] : no dysphagia [Hearing Loss] : no hearing loss  [Palpitations] : no palpitations [Lower Ext Edema] : no lower extremity edema [Shortness Of Breath] : no shortness of breath [Cough] : no cough [Wheezing] : no wheezing [SOB on Exertion] : no shortness of breath on exertion [Nausea] : no nausea [Constipation] : no constipation [Heartburn] : no heartburn [Diarrhea] : no diarrhea [Polyuria] : no polyuria [Dysuria] : no dysuria [Muscle Weakness] : no muscle weakness [Myalgia] : no myalgia  [Dry Skin] : no dry skin [Hair Loss] : no hair loss [Ulcer] : no ulcer [Headaches] : no headaches [Tremors] : no tremors [Depression] : no depression [Anxiety] : no anxiety [Stress] : no stress [Polydipsia] : no polydipsia [Cold Intolerance] : no cold intolerance [Heat Intolerance] : no heat intolerance [Easy Bleeding] : no ~M tendency for easy bleeding [Easy Bruising] : no tendency for easy bruising [FreeTextEntry2] : Has lost 4 lb since her last visit [FreeTextEntry5] : Had mild chest heaviness when she was climbing stairs this morning--did not yet tell her cardiologist [FreeTextEntry7] : Has occasional reflux symptoms despite the PPI [FreeTextEntry8] : Ran out of Vesicare, and now has more daytime urinary frequency with occasional leakage.  Has nocturia 1-2X/night [FreeTextEntry9] : Arthralgias in her knees > shoulders [de-identified] : Has subjective numbness in her toes

## 2023-05-18 ENCOUNTER — APPOINTMENT (OUTPATIENT)
Dept: ENDOCRINOLOGY | Facility: CLINIC | Age: 88
End: 2023-05-18
Payer: MEDICARE

## 2023-05-18 VITALS
SYSTOLIC BLOOD PRESSURE: 139 MMHG | OXYGEN SATURATION: 95 % | HEIGHT: 61 IN | WEIGHT: 176 LBS | BODY MASS INDEX: 33.23 KG/M2 | DIASTOLIC BLOOD PRESSURE: 79 MMHG | HEART RATE: 71 BPM | TEMPERATURE: 97.8 F

## 2023-05-18 DIAGNOSIS — R26.81 UNSTEADINESS ON FEET: ICD-10-CM

## 2023-05-18 PROCEDURE — 99214 OFFICE O/P EST MOD 30 MIN: CPT

## 2023-05-21 NOTE — PHYSICAL EXAM
[Alert] : alert [No Acute Distress] : no acute distress [Normal Sclera/Conjunctiva] : normal sclera/conjunctiva [EOMI] : extra ocular movement intact [PERRL] : pupils equal, round and reactive to light [No Proptosis] : no proptosis [No Lid Lag] : no lid lag [Normal Outer Ear/Nose] : the ears and nose were normal in appearance [Normal Hearing] : hearing was normal [No Neck Mass] : no neck mass was observed [No LAD] : no lymphadenopathy [Clear to Auscultation] : lungs were clear to auscultation bilaterally [Normal Rate] : heart rate was normal [Regular Rhythm] : with a regular rhythm [No Edema] : no peripheral edema [Not Tender] : non-tender [Soft] : abdomen soft [No HSM] : no hepato-splenomegaly [Normal Supraclavicular Nodes] : no supraclavicular lymphadenopathy [No CVA Tenderness] : no ~M costovertebral angle tenderness [Normal Anterior Cervical Nodes] : no anterior cervical lymphadenopathy [No Involuntary Movements] : no involuntary movements were seen [No Joint Swelling] : no joint swelling seen [Normal Strength/Tone] : muscle strength and tone were normal [No Rash] : no rash [Normal] : normal [2+] : 2+ in the dorsalis pedis [Vibration Dec.] : diminished vibratory sensation at the level of the toes [No Tremors] : no tremors [Normal Sensation on Monofilament Testing] : normal sensation on monofilament testing of lower extremities [Normal Affect] : the affect was normal [Normal Mood] : the mood was normal [Acanthosis Nigricans] : no acanthosis nigricans [Foot Ulcers] : no foot ulcers [Delayed in the Right Toes] : normal in the toes [Delayed in the Left Toes] : normal in the toes [Position Sense Dec.] : normal position sense at the level of the toes [#1 Diminished] : number 1 was normal [#2 Diminished] : number 2 was normal [#3 Diminished] : number 3 was normal [#4 Diminished] : number 4 was normal [#5 Diminished] : number 5 was normal [#6 Diminished] : number 6 was normal [#7 Diminished] : number 7 was normal [#8 Diminished] : number 8 was normal [#9 Diminished] : number 9 was normal [#10 Diminished] : number 10 was normal [de-identified] : Moderately obese.  Seems somewhat bradykinetic [de-identified] : No corneal arcus or xanthelasma [de-identified] : Grade 2-3/6 BRIGIDO at the LSB and aortic area [de-identified] : Thyroid gland is at the upper limits of normal in size [de-identified] : Transmitted murmur or intrinsic bruit over both carotids.  DP pulses 2+ bilaterally [de-identified] : Mild dorsal kyphosis [de-identified] : Scattered mildly pigmented, scaly lesions on her face [de-identified] : Vibratory sensation severely decreased over the toes and malleoli

## 2023-05-21 NOTE — ASSESSMENT
[FreeTextEntry1] : Will not add Zetia to her regimen, since she is almost certainly missinig at least a few doses a week of rosuvastatin\par \par CT head--non-contrast--

## 2023-05-21 NOTE — REVIEW OF SYSTEMS
[Fatigue] : fatigue [Back Pain] : back pain [Difficulty Walking] : difficulty walking [Poor Balance] : poor balance [Decreased Appetite] : appetite not decreased [Fever] : no fever [Chills] : no chills [Dry Eyes] : no dryness [Blurred Vision] : no blurred vision [Eyes Itch] : no itch [Hearing Loss] : no hearing loss  [Dysphagia] : no dysphagia [Chest Pain] : no chest pain [Palpitations] : no palpitations [Shortness Of Breath] : no shortness of breath [Lower Ext Edema] : no lower extremity edema [Wheezing] : no wheezing [SOB on Exertion] : no shortness of breath on exertion [Constipation] : no constipation [Nausea] : no nausea [Heartburn] : no heartburn [Diarrhea] : no diarrhea [Polyuria] : no polyuria [Dysuria] : no dysuria [Muscle Weakness] : no muscle weakness [Myalgia] : no myalgia  [Dry Skin] : no dry skin [Hair Loss] : no hair loss [Headaches] : no headaches [Ulcer] : no ulcer [Tremors] : no tremors [Depression] : no depression [Anxiety] : no anxiety [Stress] : no stress [Cold Intolerance] : no cold intolerance [Polydipsia] : no polydipsia [Heat Intolerance] : no heat intolerance [Easy Bleeding] : no ~M tendency for easy bleeding [Easy Bruising] : no tendency for easy bruising [FreeTextEntry2] : Has gained 9 lb since her last visit [FreeTextEntry6] : See HPI re: cough [FreeTextEntry4] : Chronic nasal congestion [FreeTextEntry8] : Ran out of Vesicare, and now has more daytime urinary frequency with occasional leakage.  Has only occasional nocturia [FreeTextEntry9] : Arthralgias in her knees > shoulders [de-identified] : Has subjective numbness in her toes

## 2023-05-21 NOTE — HISTORY OF PRESENT ILLNESS
[FreeTextEntry1] : 87-year-old woman who is followed for type 2 DM, hypothyroidism, hyperlipidemia and hypertension.  She has multiple other medical problems which include lumbar spinal stenosis, osteoarthritis of her hands and knees, valvular heart disease (aortic sclerosis with mild AS, also mild MR), irritable bladder and bilateral rotator cuff tears (with previous arthroscopic surgery on her left shoulder in 2017).  She also has a history of nephrolithiasis\par \par Has not been feeling well, but cannot be specific about her symptoms--except that she has had a headache for the past 2 days.  Took her temperature--?? because of a subjective fever--but this was normal.\par Describes a "cough"--but this appears to be a very short paroxysm which occurs only once or twice a day\par Saw her PCP, who told her that her lungs were clear and did not order any additional testing\par \par Has gained 9 lb, but cannot say how this occurred.\par Has not been doing any fingerstick monitoring--"I was very bad"\par Diet reviewed: \par --Breakfast is either oatmeal or one of her partner's sandwiches (one slice of bread with sour cream, garlic, and a slice of cheese)--but has been having the sandwiches less frequently\par --Main meal is in the afternoon.  Protein is fish at least 4X/week, otherwise chicken.  Starch is usually buckwheat.  Has been  \par --Has fruit in the evening as the third meal\par --Has 3-4 cookies in the evening with tea \par \par Another brother  last month--apparently of an acute MI \par Ophthalmology exam last month was apparently negative for retinopathy\par Denies any numbness or paresthesias in her feet\par Has not been monitoring BPs at home\par Admits to missing doses of rosuvastatin

## 2023-05-22 ENCOUNTER — RX RENEWAL (OUTPATIENT)
Age: 88
End: 2023-05-22

## 2023-05-22 RX ORDER — ROSUVASTATIN CALCIUM 40 MG/1
40 TABLET, FILM COATED ORAL
Qty: 90 | Refills: 3 | Status: ACTIVE | COMMUNITY
Start: 2021-06-24 | End: 1900-01-01

## 2023-09-11 ENCOUNTER — RX RENEWAL (OUTPATIENT)
Age: 88
End: 2023-09-11

## 2023-09-11 RX ORDER — METFORMIN ER 500 MG 500 MG/1
500 TABLET ORAL
Qty: 180 | Refills: 3 | Status: ACTIVE | COMMUNITY
Start: 2018-07-26 | End: 1900-01-01

## 2023-09-15 ENCOUNTER — APPOINTMENT (OUTPATIENT)
Dept: ENDOCRINOLOGY | Facility: CLINIC | Age: 88
End: 2023-09-15
Payer: MEDICARE

## 2023-09-15 VITALS
BODY MASS INDEX: 33.23 KG/M2 | HEIGHT: 61 IN | DIASTOLIC BLOOD PRESSURE: 74 MMHG | OXYGEN SATURATION: 96 % | HEART RATE: 73 BPM | WEIGHT: 176 LBS | TEMPERATURE: 97.2 F | SYSTOLIC BLOOD PRESSURE: 126 MMHG

## 2023-09-15 DIAGNOSIS — M25.511 PAIN IN RIGHT SHOULDER: ICD-10-CM

## 2023-09-15 DIAGNOSIS — R60.0 LOCALIZED EDEMA: ICD-10-CM

## 2023-09-15 DIAGNOSIS — F09 UNSPECIFIED MENTAL DISORDER DUE TO KNOWN PHYSIOLOGICAL CONDITION: ICD-10-CM

## 2023-09-15 PROCEDURE — 99214 OFFICE O/P EST MOD 30 MIN: CPT

## 2023-09-15 RX ORDER — FUROSEMIDE 20 MG/1
20 TABLET ORAL DAILY
Qty: 90 | Refills: 3 | Status: ACTIVE | COMMUNITY
Start: 2023-09-15

## 2023-09-15 RX ORDER — LANCETS
EACH MISCELLANEOUS
Qty: 102 | Refills: 3 | Status: DISCONTINUED | COMMUNITY
Start: 2017-11-11 | End: 2023-09-15

## 2023-09-16 PROBLEM — M25.511 RIGHT SHOULDER PAIN: Status: ACTIVE | Noted: 2023-09-16

## 2023-09-16 PROBLEM — R60.0 EDEMA OF BOTH LOWER LEGS: Status: ACTIVE | Noted: 2023-09-15

## 2023-09-16 PROBLEM — F09 COGNITIVE DYSFUNCTION: Status: ACTIVE | Noted: 2021-02-28

## 2024-02-02 ENCOUNTER — APPOINTMENT (OUTPATIENT)
Dept: ENDOCRINOLOGY | Facility: CLINIC | Age: 89
End: 2024-02-02
Payer: MEDICARE

## 2024-02-02 VITALS
HEART RATE: 62 BPM | OXYGEN SATURATION: 98 % | SYSTOLIC BLOOD PRESSURE: 167 MMHG | BODY MASS INDEX: 28.91 KG/M2 | TEMPERATURE: 98.2 F | WEIGHT: 153 LBS | DIASTOLIC BLOOD PRESSURE: 85 MMHG

## 2024-02-02 DIAGNOSIS — E78.00 PURE HYPERCHOLESTEROLEMIA, UNSPECIFIED: ICD-10-CM

## 2024-02-02 DIAGNOSIS — E03.9 HYPOTHYROIDISM, UNSPECIFIED: ICD-10-CM

## 2024-02-02 DIAGNOSIS — E11.9 TYPE 2 DIABETES MELLITUS W/OUT COMPLICATIONS: ICD-10-CM

## 2024-02-02 DIAGNOSIS — I10 ESSENTIAL (PRIMARY) HYPERTENSION: ICD-10-CM

## 2024-02-02 PROCEDURE — G2211 COMPLEX E/M VISIT ADD ON: CPT

## 2024-02-02 PROCEDURE — 99214 OFFICE O/P EST MOD 30 MIN: CPT

## 2024-02-04 PROBLEM — E03.9 PRIMARY HYPOTHYROIDISM: Status: ACTIVE | Noted: 2019-03-14

## 2024-02-04 PROBLEM — E78.00 PURE HYPERCHOLESTEROLEMIA: Status: ACTIVE | Noted: 2017-11-10

## 2024-02-04 PROBLEM — E11.9 TYPE 2 DIABETES MELLITUS: Status: ACTIVE | Noted: 2017-11-10

## 2024-02-04 PROBLEM — I10 ESSENTIAL HYPERTENSION: Status: ACTIVE | Noted: 2017-11-11

## 2024-02-04 RX ORDER — APIXABAN 5 MG/1
5 TABLET, FILM COATED ORAL
Qty: 60 | Refills: 0 | Status: ACTIVE | COMMUNITY
Start: 2023-11-20

## 2024-02-04 NOTE — PHYSICAL EXAM
[Alert] : alert [Normal Sclera/Conjunctiva] : normal sclera/conjunctiva [EOMI] : extra ocular movement intact [PERRL] : pupils equal, round and reactive to light [No Proptosis] : no proptosis [No Lid Lag] : no lid lag [Normal Outer Ear/Nose] : the ears and nose were normal in appearance [Normal Hearing] : hearing was normal [No Neck Mass] : no neck mass was observed [No LAD] : no lymphadenopathy [Thyroid Not Enlarged] : the thyroid was not enlarged [No Thyroid Nodules] : no palpable thyroid nodules [Clear to Auscultation] : lungs were clear to auscultation bilaterally [Normal Rate] : heart rate was normal [Regular Rhythm] : with a regular rhythm [No Varicosities] : there were no varicosital changes [Not Tender] : non-tender [Soft] : abdomen soft [No HSM] : no hepato-splenomegaly [Normal Supraclavicular Nodes] : no supraclavicular lymphadenopathy [Normal Anterior Cervical Nodes] : no anterior cervical lymphadenopathy [No CVA Tenderness] : no ~M costovertebral angle tenderness [No Involuntary Movements] : no involuntary movements were seen [No Joint Swelling] : no joint swelling seen [No Rash] : no rash [Swelling] : swollen [Normal] : normal [2+] : 2+ in the dorsalis pedis [Vibration Dec.] : diminished vibratory sensation at the level of the toes [No Tremors] : no tremors [Normal Sensation on Monofilament Testing] : normal sensation on monofilament testing of lower extremities [Normal Affect] : the affect was normal [Normal Mood] : the mood was normal [Acanthosis Nigricans] : no acanthosis nigricans [Foot Ulcers] : no foot ulcers [Delayed in the Right Toes] : normal in the toes [Tenderness] : not tender [Erythema] : not erythematous [Delayed in the Left Toes] : normal in the toes [Position Sense Dec.] : normal position sense at the level of the toes [#1 Diminished] : number 1 was normal [#2 Diminished] : number 2 was normal [#3 Diminished] : number 3 was normal [#4 Diminished] : number 4 was normal [#5 Diminished] : number 5 was normal [#6 Diminished] : number 6 was normal [#7 Diminished] : number 7 was normal [#8 Diminished] : number 8 was normal [#9 Diminished] : number 9 was normal [#10 Diminished] : number 10 was normal [de-identified] : Is still overweight, but visibly thinner.  Is also in pain from her back [de-identified] : Grade 2-3/6 BRIGIDO at the LSB and aortic area [de-identified] : No corneal arcus or xanthelasma [de-identified] : Transmitted murmur or intrinsic bruit over both carotids.  DP pulses 2+ bilaterally.  1+ edema of the left lower leg [de-identified] : Mild dorsal kyphosis [de-identified] : ROM of her R shoulder has improved  [de-identified] : Vibratory sensation essentially absent over the toes and malleoli

## 2024-02-04 NOTE — DATA REVIEWED
[FreeTextEntry1] : [facility]  (2/1/24)  ,  A1c 5.8% CMP--Creatinine 1.18 mg%, otherwise WNL , HDL 56,  Free T4 1.0 ng/dl  (0.8-1.) TSH 4.84 uU/ml  (0.4-4.5)

## 2024-02-04 NOTE — ASSESSMENT
[FreeTextEntry2] : Diet, resumption of fingerstick and BP monitoring at home [FreeTextEntry1] : 1) Type 2 DM:  A1c level is excellent despite the apparent exacerbation of hyperglycemia at [facility] --Diet was reinforced --Continue metformin  --Encouraged to resume FS monitoring--do readings pre-breakfast or post lunch on alternate days  2) Hypothyroidism:  TSH level is borderline high, but she may well have missed levothyroxine doses while at [facility], and did not resume the medication until a few days after she returned home.  Will continue her usual dose, but this may need to be decreased if she remains at her current (decreased) weight --Continue levothyroxine 100 mcg/day  3) Hypercholesterolemia:  LDL-cholesterol is markedly elevated, but she almost certainly was not getting her statin at [facility], and only resumed the rosuvastatin the day before her bloodwork was drawn --Continue rosuvastatin 40 mg/day  4) Hypertension:  BP is elevated at today's visit, but she did not take her meds this morning, and has not been monitoring at home.   --To continue losartan and furosemide --Needs to resume BP monitoring at home  Target of < 140/85 (ideally < 130/80) was discussed  See for follow-up in 4 months.  CMP, lipids, A1c, TFTs, microalbumin before the visit

## 2024-02-04 NOTE — HISTORY OF PRESENT ILLNESS
[FreeTextEntry1] : 88-year-old woman who is followed for type 2 DM, hypothyroidism, hyperlipidemia and hypertension.  She has multiple other medical problems which include lumbar spinal stenosis, osteoarthritis of her hands and knees, valvular heart disease (aortic sclerosis with mild AS, also mild MR), irritable bladder and bilateral rotator cuff tears (with previous arthroscopic surgery on her left shoulder in 2017).  She also has a history of nephrolithiasis  Was [facility] for swelling of her L leg, and was found to have a DVT.  Was started on Eliquis 2.5 mg BID, and most of the swelling has since resolved.  Was apparently sent to [facility] after the hospitalization, during which time she developed an acute arthritis in her L knee, and was given a steroid injection which then provoked marked hyperglycemia.   She was not discharged from Rehab until a week ago, and did not restart her usual medications until 2 days ago. Has not restarted her BP or fingerstick monitoring Has had a significant exacerbation of back pain, but cannot receive any epidural steroids while still on Eliquis. Has lost 23 lb in weight--blames the food in the Rehab Center Says that she is back on her usual diet: --Breakfast is still usually oatmeal (with blueberries), sometimes has half a sandwich made by her  (garlic sour cream, etc)  --Main meal is in the afternoon.  She is vague about the protein at the meal.  The starch seems to be mostly buckwheat. --Third meal is.just a piece of fruit in the evening--often half an apple.  Will have a few cookies "occasionally"

## 2024-02-04 NOTE — REVIEW OF SYSTEMS
[Fatigue] : fatigue [Lower Ext Edema] : lower extremity edema [SOB on Exertion] : shortness of breath on exertion [Back Pain] : back pain [Difficulty Walking] : difficulty walking [Poor Balance] : poor balance [Decreased Appetite] : appetite not decreased [Fever] : no fever [Chills] : no chills [Dry Eyes] : no dryness [Blurred Vision] : no blurred vision [Eyes Itch] : no itch [Dysphagia] : no dysphagia [Hearing Loss] : no hearing loss  [Chest Pain] : no chest pain [Palpitations] : no palpitations [Cough] : no cough [Shortness Of Breath] : no shortness of breath [Wheezing] : no wheezing [Nausea] : no nausea [Constipation] : no constipation [Heartburn] : no heartburn [Diarrhea] : no diarrhea [Polyuria] : no polyuria [Dysuria] : no dysuria [Muscle Weakness] : no muscle weakness [Myalgia] : no myalgia  [Dry Skin] : no dry skin [Hair Loss] : no hair loss [Ulcer] : no ulcer [Headaches] : no headaches [Tremors] : no tremors [Anxiety] : no anxiety [Depression] : no depression [Stress] : no stress [Polydipsia] : no polydipsia [Cold Intolerance] : no cold intolerance [Heat Intolerance] : no heat intolerance [Easy Bleeding] : no ~M tendency for easy bleeding [Easy Bruising] : no tendency for easy bruising [FreeTextEntry2] : Has lost 23 lb since her last visit [FreeTextEntry8] : Nocturia once a night [FreeTextEntry9] : Has significant back pain, worse with ambulation, but without a radicular component [de-identified] : Has subjective numbness in her toes and in the 4-5th fingers of her left hand

## 2024-07-05 ENCOUNTER — APPOINTMENT (OUTPATIENT)
Dept: ENDOCRINOLOGY | Facility: CLINIC | Age: 89
End: 2024-07-05
Payer: MEDICARE

## 2024-07-05 VITALS — DIASTOLIC BLOOD PRESSURE: 77 MMHG | SYSTOLIC BLOOD PRESSURE: 176 MMHG

## 2024-07-05 VITALS
SYSTOLIC BLOOD PRESSURE: 176 MMHG | TEMPERATURE: 97.2 F | HEART RATE: 56 BPM | WEIGHT: 165 LBS | OXYGEN SATURATION: 99 % | DIASTOLIC BLOOD PRESSURE: 80 MMHG | HEIGHT: 61 IN | BODY MASS INDEX: 31.15 KG/M2

## 2024-07-05 DIAGNOSIS — E11.9 TYPE 2 DIABETES MELLITUS W/OUT COMPLICATIONS: ICD-10-CM

## 2024-07-05 DIAGNOSIS — I82.492 ACUTE EMBOLISM AND THROMBOSIS OF OTHER SPECIFIED DEEP VEIN OF LEFT LOWER EXTREMITY: ICD-10-CM

## 2024-07-05 DIAGNOSIS — I10 ESSENTIAL (PRIMARY) HYPERTENSION: ICD-10-CM

## 2024-07-05 DIAGNOSIS — E78.00 PURE HYPERCHOLESTEROLEMIA, UNSPECIFIED: ICD-10-CM

## 2024-07-05 DIAGNOSIS — R60.0 LOCALIZED EDEMA: ICD-10-CM

## 2024-07-05 DIAGNOSIS — E03.9 HYPOTHYROIDISM, UNSPECIFIED: ICD-10-CM

## 2024-07-05 DIAGNOSIS — F09 UNSPECIFIED MENTAL DISORDER DUE TO KNOWN PHYSIOLOGICAL CONDITION: ICD-10-CM

## 2024-07-05 PROCEDURE — 99214 OFFICE O/P EST MOD 30 MIN: CPT

## 2024-07-05 PROCEDURE — G2211 COMPLEX E/M VISIT ADD ON: CPT

## 2024-07-05 RX ORDER — APIXABAN 2.5 MG/1
2.5 TABLET, FILM COATED ORAL
Refills: 0 | Status: ACTIVE | COMMUNITY
Start: 2024-07-05

## 2024-07-07 PROBLEM — I82.492 DEEP VEIN THROMBOSIS (DVT) OF OTHER VEIN OF LEFT LOWER EXTREMITY: Status: ACTIVE | Noted: 2024-07-05

## 2024-07-07 PROBLEM — R60.0 EDEMA OF BOTH LOWER LEGS: Status: RESOLVED | Noted: 2023-09-15 | Resolved: 2024-07-07

## 2024-11-18 ENCOUNTER — APPOINTMENT (OUTPATIENT)
Dept: ENDOCRINOLOGY | Facility: CLINIC | Age: 89
End: 2024-11-18
Payer: MEDICARE

## 2024-11-18 VITALS
TEMPERATURE: 97.2 F | OXYGEN SATURATION: 98 % | DIASTOLIC BLOOD PRESSURE: 73 MMHG | BODY MASS INDEX: 30.58 KG/M2 | WEIGHT: 162 LBS | SYSTOLIC BLOOD PRESSURE: 126 MMHG | HEIGHT: 61 IN | HEART RATE: 70 BPM

## 2024-11-18 DIAGNOSIS — E11.9 TYPE 2 DIABETES MELLITUS W/OUT COMPLICATIONS: ICD-10-CM

## 2024-11-18 DIAGNOSIS — E03.9 HYPOTHYROIDISM, UNSPECIFIED: ICD-10-CM

## 2024-11-18 DIAGNOSIS — F09 UNSPECIFIED MENTAL DISORDER DUE TO KNOWN PHYSIOLOGICAL CONDITION: ICD-10-CM

## 2024-11-18 DIAGNOSIS — I10 ESSENTIAL (PRIMARY) HYPERTENSION: ICD-10-CM

## 2024-11-18 DIAGNOSIS — E78.00 PURE HYPERCHOLESTEROLEMIA, UNSPECIFIED: ICD-10-CM

## 2024-11-18 DIAGNOSIS — I35.8 OTHER NONRHEUMATIC AORTIC VALVE DISORDERS: ICD-10-CM

## 2024-11-18 PROCEDURE — 99214 OFFICE O/P EST MOD 30 MIN: CPT

## 2024-11-18 PROCEDURE — G2211 COMPLEX E/M VISIT ADD ON: CPT

## 2024-11-18 RX ORDER — ROSUVASTATIN CALCIUM 20 MG/1
20 TABLET, FILM COATED ORAL DAILY
Qty: 90 | Refills: 3 | Status: ACTIVE | COMMUNITY
Start: 2024-11-18

## 2024-11-18 RX ORDER — RIVAROXABAN 20 MG/1
20 TABLET, FILM COATED ORAL
Refills: 0 | Status: ACTIVE | COMMUNITY
Start: 2024-11-18

## 2025-03-17 ENCOUNTER — APPOINTMENT (OUTPATIENT)
Dept: ENDOCRINOLOGY | Facility: CLINIC | Age: 89
End: 2025-03-17